# Patient Record
Sex: MALE | Race: BLACK OR AFRICAN AMERICAN | NOT HISPANIC OR LATINO | Employment: OTHER | URBAN - METROPOLITAN AREA
[De-identification: names, ages, dates, MRNs, and addresses within clinical notes are randomized per-mention and may not be internally consistent; named-entity substitution may affect disease eponyms.]

---

## 2017-01-01 ENCOUNTER — HOSPITAL ENCOUNTER (OUTPATIENT)
Dept: INFUSION CENTER | Facility: HOSPITAL | Age: 62
Discharge: HOME | End: 2017-05-26
Payer: COMMERCIAL

## 2017-01-01 ENCOUNTER — HOSPITAL ENCOUNTER (EMERGENCY)
Facility: HOSPITAL | Age: 62
Discharge: HOME | End: 2017-04-27
Attending: EMERGENCY MEDICINE | Admitting: EMERGENCY MEDICINE
Payer: COMMERCIAL

## 2017-01-01 ENCOUNTER — ALLSCRIPTS OFFICE VISIT (OUTPATIENT)
Dept: OTHER | Facility: OTHER | Age: 62
End: 2017-01-01

## 2017-01-01 ENCOUNTER — GENERIC CONVERSION - ENCOUNTER (OUTPATIENT)
Dept: OTHER | Facility: OTHER | Age: 62
End: 2017-01-01

## 2017-01-01 ENCOUNTER — TRANSCRIBE ORDERS (OUTPATIENT)
Dept: ADMINISTRATIVE | Age: 62
End: 2017-01-01

## 2017-01-01 ENCOUNTER — APPOINTMENT (INPATIENT)
Dept: RADIOLOGY | Facility: HOSPITAL | Age: 62
DRG: 871 | End: 2017-01-01
Payer: COMMERCIAL

## 2017-01-01 ENCOUNTER — APPOINTMENT (EMERGENCY)
Dept: RADIOLOGY | Facility: HOSPITAL | Age: 62
DRG: 181 | End: 2017-01-01
Payer: COMMERCIAL

## 2017-01-01 ENCOUNTER — HOSPITAL ENCOUNTER (INPATIENT)
Facility: HOSPITAL | Age: 62
LOS: 8 days | Discharge: HOME HEALTH | DRG: 871 | End: 2017-04-22
Attending: EMERGENCY MEDICINE | Admitting: HOSPITALIST
Payer: COMMERCIAL

## 2017-01-01 ENCOUNTER — APPOINTMENT (EMERGENCY)
Dept: RADIOLOGY | Facility: HOSPITAL | Age: 62
End: 2017-01-01
Payer: COMMERCIAL

## 2017-01-01 ENCOUNTER — HOSPITAL ENCOUNTER (OUTPATIENT)
Dept: RADIOLOGY | Age: 62
Discharge: HOME | End: 2017-02-13
Payer: COMMERCIAL

## 2017-01-01 ENCOUNTER — APPOINTMENT (EMERGENCY)
Dept: RADIOLOGY | Facility: HOSPITAL | Age: 62
DRG: 180 | End: 2017-01-01
Payer: COMMERCIAL

## 2017-01-01 ENCOUNTER — HOSPITAL ENCOUNTER (OUTPATIENT)
Dept: INFUSION CENTER | Facility: HOSPITAL | Age: 62
Discharge: HOME | End: 2017-05-10
Payer: COMMERCIAL

## 2017-01-01 ENCOUNTER — HOSPITAL ENCOUNTER (INPATIENT)
Facility: HOSPITAL | Age: 62
LOS: 6 days | DRG: 180 | End: 2017-06-08
Attending: EMERGENCY MEDICINE | Admitting: INTERNAL MEDICINE
Payer: COMMERCIAL

## 2017-01-01 ENCOUNTER — HOSPITAL ENCOUNTER (OUTPATIENT)
Dept: RADIOLOGY | Age: 62
Discharge: HOME | End: 2017-05-19
Payer: COMMERCIAL

## 2017-01-01 ENCOUNTER — APPOINTMENT (EMERGENCY)
Dept: RADIOLOGY | Facility: HOSPITAL | Age: 62
DRG: 687 | End: 2017-01-01
Payer: COMMERCIAL

## 2017-01-01 ENCOUNTER — APPOINTMENT (EMERGENCY)
Dept: RADIOLOGY | Facility: HOSPITAL | Age: 62
DRG: 871 | End: 2017-01-01
Payer: COMMERCIAL

## 2017-01-01 ENCOUNTER — HOSPITAL ENCOUNTER (INPATIENT)
Facility: HOSPITAL | Age: 62
LOS: 1 days | Discharge: HOME | DRG: 181 | End: 2017-04-11
Attending: EMERGENCY MEDICINE | Admitting: INTERNAL MEDICINE
Payer: COMMERCIAL

## 2017-01-01 ENCOUNTER — HOSPITAL ENCOUNTER (EMERGENCY)
Facility: HOSPITAL | Age: 62
Discharge: HOME | End: 2017-05-25
Attending: EMERGENCY MEDICINE | Admitting: EMERGENCY MEDICINE
Payer: COMMERCIAL

## 2017-01-01 ENCOUNTER — APPOINTMENT (INPATIENT)
Dept: RADIOLOGY | Facility: HOSPITAL | Age: 62
DRG: 180 | End: 2017-01-01
Attending: INTERNAL MEDICINE
Payer: COMMERCIAL

## 2017-01-01 ENCOUNTER — HOSPITAL ENCOUNTER (OUTPATIENT)
Dept: INFUSION CENTER | Facility: HOSPITAL | Age: 62
Discharge: HOME | End: 2017-05-11
Payer: COMMERCIAL

## 2017-01-01 ENCOUNTER — APPOINTMENT (INPATIENT)
Dept: RADIOLOGY | Facility: HOSPITAL | Age: 62
DRG: 180 | End: 2017-01-01
Payer: COMMERCIAL

## 2017-01-01 ENCOUNTER — APPOINTMENT (INPATIENT)
Dept: PHYSICAL THERAPY | Facility: HOSPITAL | Age: 62
DRG: 181 | End: 2017-01-01
Payer: COMMERCIAL

## 2017-01-01 ENCOUNTER — APPOINTMENT (INPATIENT)
Dept: RADIOLOGY | Facility: HOSPITAL | Age: 62
DRG: 687 | End: 2017-01-01
Payer: COMMERCIAL

## 2017-01-01 ENCOUNTER — HOSPITAL ENCOUNTER (OUTPATIENT)
Dept: INFUSION CENTER | Facility: HOSPITAL | Age: 62
End: 2017-01-01
Payer: COMMERCIAL

## 2017-01-01 ENCOUNTER — HOSPITAL ENCOUNTER (INPATIENT)
Facility: HOSPITAL | Age: 62
LOS: 2 days | Discharge: HOME HEALTH | DRG: 687 | End: 2017-03-08
Attending: EMERGENCY MEDICINE | Admitting: INTERNAL MEDICINE
Payer: COMMERCIAL

## 2017-01-01 ENCOUNTER — APPOINTMENT (INPATIENT)
Dept: RADIOLOGY | Facility: HOSPITAL | Age: 62
DRG: 181 | End: 2017-01-01
Attending: INTERNAL MEDICINE
Payer: COMMERCIAL

## 2017-01-01 ENCOUNTER — TRANSCRIBE ORDERS (OUTPATIENT)
Dept: ADMINISTRATIVE | Facility: HOSPITAL | Age: 62
End: 2017-01-01

## 2017-01-01 VITALS
TEMPERATURE: 97.2 F | HEIGHT: 69 IN | DIASTOLIC BLOOD PRESSURE: 55 MMHG | RESPIRATION RATE: 18 BRPM | WEIGHT: 162.26 LBS | HEART RATE: 91 BPM | SYSTOLIC BLOOD PRESSURE: 95 MMHG | BODY MASS INDEX: 24.03 KG/M2

## 2017-01-01 VITALS
DIASTOLIC BLOOD PRESSURE: 64 MMHG | RESPIRATION RATE: 20 BRPM | BODY MASS INDEX: 23.13 KG/M2 | HEIGHT: 70 IN | HEART RATE: 64 BPM | SYSTOLIC BLOOD PRESSURE: 112 MMHG | WEIGHT: 161.6 LBS | TEMPERATURE: 96.5 F

## 2017-01-01 VITALS
HEART RATE: 60 BPM | WEIGHT: 171 LBS | OXYGEN SATURATION: 97 % | SYSTOLIC BLOOD PRESSURE: 124 MMHG | TEMPERATURE: 98.1 F | BODY MASS INDEX: 23.94 KG/M2 | DIASTOLIC BLOOD PRESSURE: 70 MMHG | RESPIRATION RATE: 18 BRPM | HEIGHT: 71 IN

## 2017-01-01 VITALS
HEART RATE: 65 BPM | DIASTOLIC BLOOD PRESSURE: 73 MMHG | WEIGHT: 171.3 LBS | RESPIRATION RATE: 18 BRPM | SYSTOLIC BLOOD PRESSURE: 143 MMHG | BODY MASS INDEX: 23.98 KG/M2 | HEIGHT: 71 IN | TEMPERATURE: 98.3 F | OXYGEN SATURATION: 97 %

## 2017-01-01 VITALS
BODY MASS INDEX: 24.29 KG/M2 | TEMPERATURE: 97.6 F | HEART RATE: 56 BPM | DIASTOLIC BLOOD PRESSURE: 63 MMHG | WEIGHT: 173.5 LBS | HEIGHT: 71 IN | SYSTOLIC BLOOD PRESSURE: 91 MMHG | RESPIRATION RATE: 20 BRPM | OXYGEN SATURATION: 97 %

## 2017-01-01 VITALS
HEIGHT: 71 IN | OXYGEN SATURATION: 95 % | WEIGHT: 162.7 LBS | SYSTOLIC BLOOD PRESSURE: 147 MMHG | DIASTOLIC BLOOD PRESSURE: 92 MMHG | HEART RATE: 125 BPM | BODY MASS INDEX: 22.78 KG/M2 | RESPIRATION RATE: 22 BRPM | TEMPERATURE: 98.1 F

## 2017-01-01 VITALS
OXYGEN SATURATION: 99 % | HEART RATE: 88 BPM | RESPIRATION RATE: 18 BRPM | SYSTOLIC BLOOD PRESSURE: 110 MMHG | TEMPERATURE: 99.5 F | BODY MASS INDEX: 23.99 KG/M2 | DIASTOLIC BLOOD PRESSURE: 70 MMHG | WEIGHT: 172 LBS

## 2017-01-01 VITALS
DIASTOLIC BLOOD PRESSURE: 56 MMHG | SYSTOLIC BLOOD PRESSURE: 98 MMHG | TEMPERATURE: 96 F | RESPIRATION RATE: 20 BRPM | HEART RATE: 68 BPM

## 2017-01-01 VITALS
RESPIRATION RATE: 20 BRPM | HEART RATE: 111 BPM | WEIGHT: 162 LBS | SYSTOLIC BLOOD PRESSURE: 117 MMHG | TEMPERATURE: 98.3 F | OXYGEN SATURATION: 94 % | BODY MASS INDEX: 22.93 KG/M2 | DIASTOLIC BLOOD PRESSURE: 80 MMHG

## 2017-01-01 DIAGNOSIS — R53.1 GENERALIZED WEAKNESS: ICD-10-CM

## 2017-01-01 DIAGNOSIS — M75.01 ADHESIVE CAPSULITIS OF RIGHT SHOULDER: ICD-10-CM

## 2017-01-01 DIAGNOSIS — C78.00 LUNG METASTASIS (HCC): Primary | ICD-10-CM

## 2017-01-01 DIAGNOSIS — R65.10 SIRS (SYSTEMIC INFLAMMATORY RESPONSE SYNDROME) (HCC): ICD-10-CM

## 2017-01-01 DIAGNOSIS — R06.02 SHORTNESS OF BREATH: ICD-10-CM

## 2017-01-01 DIAGNOSIS — J90 PLEURAL EFFUSION: Primary | ICD-10-CM

## 2017-01-01 DIAGNOSIS — C64.2 RENAL CELL CARCINOMA, LEFT (HCC): ICD-10-CM

## 2017-01-01 DIAGNOSIS — J91.0 PLEURAL EFFUSION, MALIGNANT: ICD-10-CM

## 2017-01-01 DIAGNOSIS — M75.01 ADHESIVE BURSITIS OF RIGHT SHOULDER: Primary | ICD-10-CM

## 2017-01-01 DIAGNOSIS — C78.00 METASTATIC CANCER TO LUNG (HCC): Primary | ICD-10-CM

## 2017-01-01 DIAGNOSIS — R09.02 HYPOXEMIA: ICD-10-CM

## 2017-01-01 DIAGNOSIS — C64.9 MALIGNANT NEOPLASM OF KIDNEY EXCLUDING RENAL PELVIS (HCC): ICD-10-CM

## 2017-01-01 DIAGNOSIS — R06.02 SHORTNESS OF BREATH: Primary | ICD-10-CM

## 2017-01-01 DIAGNOSIS — J90 PLEURAL EFFUSION, NOT ELSEWHERE CLASSIFIED: ICD-10-CM

## 2017-01-01 DIAGNOSIS — J18.9 HOSPITAL-ACQUIRED PNEUMONIA: Primary | ICD-10-CM

## 2017-01-01 DIAGNOSIS — R05.9 COUGH: ICD-10-CM

## 2017-01-01 DIAGNOSIS — J90 PLEURAL EFFUSION: ICD-10-CM

## 2017-01-01 DIAGNOSIS — D30.02 BENIGN NEOPLASM OF LEFT KIDNEY: ICD-10-CM
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
## 2017-01-01 DIAGNOSIS — J18.1 LUNG CONSOLIDATION (HCC): Primary | ICD-10-CM

## 2017-01-01 DIAGNOSIS — C64.9 METASTATIC RENAL CELL CARCINOMA (HCC): ICD-10-CM

## 2017-01-01 DIAGNOSIS — M25.511 PAIN IN RIGHT SHOULDER: ICD-10-CM

## 2017-01-01 LAB
ALBUMIN FLD-MCNC: 3.1 G/DL
ALBUMIN SERPL BCP-MCNC: 2 G/DL (ref 3.5–5)
ALBUMIN SERPL BCP-MCNC: 2.1 G/DL (ref 3.5–5)
ALBUMIN SERPL BCP-MCNC: 2.5 G/DL (ref 3.5–5)
ALBUMIN SERPL BCP-MCNC: 2.5 G/DL (ref 3.5–5)
ALBUMIN SERPL BCP-MCNC: 2.7 G/DL (ref 3.5–5)
ALBUMIN SERPL BCP-MCNC: 2.7 G/DL (ref 3.5–5)
ALP SERPL-CCNC: 53 U/L (ref 46–116)
ALP SERPL-CCNC: 55 U/L (ref 46–116)
ALP SERPL-CCNC: 57 U/L (ref 46–116)
ALP SERPL-CCNC: 58 U/L (ref 46–116)
ALP SERPL-CCNC: 76 U/L (ref 46–116)
ALP SERPL-CCNC: 81 U/L (ref 46–116)
ALT SERPL W P-5'-P-CCNC: 16 U/L (ref 12–78)
ALT SERPL W P-5'-P-CCNC: 17 U/L (ref 12–78)
ALT SERPL W P-5'-P-CCNC: 17 U/L (ref 12–78)
ALT SERPL W P-5'-P-CCNC: 20 U/L (ref 12–78)
ALT SERPL W P-5'-P-CCNC: 21 U/L (ref 12–78)
ALT SERPL W P-5'-P-CCNC: 21 U/L (ref 12–78)
ANION GAP SERPL CALCULATED.3IONS-SCNC: 11 MMOL/L (ref 4–13)
ANION GAP SERPL CALCULATED.3IONS-SCNC: 5 MMOL/L (ref 4–13)
ANION GAP SERPL CALCULATED.3IONS-SCNC: 6 MMOL/L (ref 4–13)
ANION GAP SERPL CALCULATED.3IONS-SCNC: 7 MMOL/L (ref 4–13)
ANION GAP SERPL CALCULATED.3IONS-SCNC: 8 MMOL/L (ref 4–13)
ANION GAP SERPL CALCULATED.3IONS-SCNC: 8 MMOL/L (ref 4–13)
ANION GAP SERPL CALCULATED.3IONS-SCNC: 9 MMOL/L (ref 4–13)
ANISOCYTOSIS BLD QL SMEAR: PRESENT
APPEARANCE FLD: ABNORMAL
APTT PPP: 28 SECONDS (ref 23–35)
APTT PPP: 29 SECONDS (ref 24–36)
AST SERPL W P-5'-P-CCNC: 18 U/L (ref 5–45)
AST SERPL W P-5'-P-CCNC: 18 U/L (ref 5–45)
AST SERPL W P-5'-P-CCNC: 21 U/L (ref 5–45)
AST SERPL W P-5'-P-CCNC: 28 U/L (ref 5–45)
AST SERPL W P-5'-P-CCNC: 31 U/L (ref 5–45)
AST SERPL W P-5'-P-CCNC: 73 U/L (ref 5–45)
ATRIAL RATE: 101 BPM
ATRIAL RATE: 103 BPM
ATRIAL RATE: 56 BPM
ATRIAL RATE: 67 BPM
ATRIAL RATE: 72 BPM
ATRIAL RATE: 80 BPM
ATRIAL RATE: 85 BPM
BACTERIA BLD CULT: NORMAL
BACTERIA SPEC BFLD CULT: NO GROWTH
BACTERIA SPT RESP CULT: NORMAL
BACTERIA STL CULT: NORMAL
BACTERIA STL CULT: NORMAL
BASOPHILS # BLD AUTO: 0.01 THOUSANDS/ΜL (ref 0–0.1)
BASOPHILS # BLD AUTO: 0.02 THOUSANDS/ΜL (ref 0–0.1)
BASOPHILS # BLD MANUAL: 0 THOUSAND/UL (ref 0–0.1)
BASOPHILS NFR BLD AUTO: 0 % (ref 0–1)
BASOPHILS NFR MAR MANUAL: 0 % (ref 0–1)
BILIRUB SERPL-MCNC: 0.29 MG/DL (ref 0.2–1)
BILIRUB SERPL-MCNC: 0.34 MG/DL (ref 0.2–1)
BILIRUB SERPL-MCNC: 0.39 MG/DL (ref 0.2–1)
BILIRUB SERPL-MCNC: 0.39 MG/DL (ref 0.2–1)
BILIRUB SERPL-MCNC: 0.43 MG/DL (ref 0.2–1)
BILIRUB SERPL-MCNC: 0.61 MG/DL (ref 0.2–1)
BILIRUB UR QL STRIP: NEGATIVE
BUN SERPL-MCNC: 10 MG/DL (ref 5–25)
BUN SERPL-MCNC: 10 MG/DL (ref 5–25)
BUN SERPL-MCNC: 12 MG/DL (ref 5–25)
BUN SERPL-MCNC: 15 MG/DL (ref 5–25)
BUN SERPL-MCNC: 23 MG/DL (ref 5–25)
BUN SERPL-MCNC: 26 MG/DL (ref 5–25)
BUN SERPL-MCNC: 28 MG/DL (ref 5–25)
BUN SERPL-MCNC: 6 MG/DL (ref 5–25)
BUN SERPL-MCNC: 7 MG/DL (ref 5–25)
BUN SERPL-MCNC: 8 MG/DL (ref 5–25)
BUN SERPL-MCNC: 9 MG/DL (ref 5–25)
C DIFF TOX GENS STL QL NAA+PROBE: NORMAL
CALCIUM SERPL-MCNC: 7.4 MG/DL (ref 8.3–10.1)
CALCIUM SERPL-MCNC: 8 MG/DL (ref 8.3–10.1)
CALCIUM SERPL-MCNC: 8.2 MG/DL (ref 8.3–10.1)
CALCIUM SERPL-MCNC: 8.3 MG/DL (ref 8.3–10.1)
CALCIUM SERPL-MCNC: 8.4 MG/DL (ref 8.3–10.1)
CALCIUM SERPL-MCNC: 8.5 MG/DL (ref 8.3–10.1)
CALCIUM SERPL-MCNC: 8.5 MG/DL (ref 8.3–10.1)
CALCIUM SERPL-MCNC: 8.7 MG/DL (ref 8.3–10.1)
CALCIUM SERPL-MCNC: 8.8 MG/DL (ref 8.3–10.1)
CALCIUM SERPL-MCNC: 8.9 MG/DL (ref 8.3–10.1)
CALCIUM SERPL-MCNC: 9.1 MG/DL (ref 8.3–10.1)
CALCIUM SERPL-MCNC: 9.2 MG/DL (ref 8.3–10.1)
CALCIUM SERPL-MCNC: 9.4 MG/DL (ref 8.3–10.1)
CALCIUM SERPL-MCNC: 9.4 MG/DL (ref 8.3–10.1)
CHLORIDE SERPL-SCNC: 101 MMOL/L (ref 100–108)
CHLORIDE SERPL-SCNC: 101 MMOL/L (ref 100–108)
CHLORIDE SERPL-SCNC: 102 MMOL/L (ref 100–108)
CHLORIDE SERPL-SCNC: 103 MMOL/L (ref 100–108)
CHLORIDE SERPL-SCNC: 104 MMOL/L (ref 100–108)
CHLORIDE SERPL-SCNC: 105 MMOL/L (ref 100–108)
CHLORIDE SERPL-SCNC: 106 MMOL/L (ref 100–108)
CHLORIDE SERPL-SCNC: 106 MMOL/L (ref 100–108)
CHLORIDE SERPL-SCNC: 107 MMOL/L (ref 100–108)
CHLORIDE SERPL-SCNC: 108 MMOL/L (ref 100–108)
CHLORIDE SERPL-SCNC: 110 MMOL/L (ref 100–108)
CHLORIDE SERPL-SCNC: 96 MMOL/L (ref 100–108)
CLARITY UR: CLEAR
CO2 SERPL-SCNC: 23 MMOL/L (ref 21–32)
CO2 SERPL-SCNC: 24 MMOL/L (ref 21–32)
CO2 SERPL-SCNC: 25 MMOL/L (ref 21–32)
CO2 SERPL-SCNC: 26 MMOL/L (ref 21–32)
CO2 SERPL-SCNC: 26 MMOL/L (ref 21–32)
CO2 SERPL-SCNC: 27 MMOL/L (ref 21–32)
CO2 SERPL-SCNC: 27 MMOL/L (ref 21–32)
CO2 SERPL-SCNC: 29 MMOL/L (ref 21–32)
CO2 SERPL-SCNC: 29 MMOL/L (ref 21–32)
CO2 SERPL-SCNC: 30 MMOL/L (ref 21–32)
CO2 SERPL-SCNC: 31 MMOL/L (ref 21–32)
CO2 SERPL-SCNC: 32 MMOL/L (ref 21–32)
CO2 SERPL-SCNC: 33 MMOL/L (ref 21–32)
COLOR FLD: YELLOW
COLOR UR: YELLOW
CREAT SERPL-MCNC: 0.59 MG/DL (ref 0.6–1.3)
CREAT SERPL-MCNC: 0.66 MG/DL (ref 0.6–1.3)
CREAT SERPL-MCNC: 0.68 MG/DL (ref 0.6–1.3)
CREAT SERPL-MCNC: 0.68 MG/DL (ref 0.6–1.3)
CREAT SERPL-MCNC: 0.73 MG/DL (ref 0.6–1.3)
CREAT SERPL-MCNC: 0.77 MG/DL (ref 0.6–1.3)
CREAT SERPL-MCNC: 0.78 MG/DL (ref 0.6–1.3)
CREAT SERPL-MCNC: 0.79 MG/DL (ref 0.6–1.3)
CREAT SERPL-MCNC: 0.81 MG/DL (ref 0.6–1.3)
CREAT SERPL-MCNC: 0.82 MG/DL (ref 0.6–1.3)
CREAT SERPL-MCNC: 0.82 MG/DL (ref 0.6–1.3)
CREAT SERPL-MCNC: 0.83 MG/DL (ref 0.6–1.3)
CREAT SERPL-MCNC: 0.89 MG/DL (ref 0.6–1.3)
CREAT SERPL-MCNC: 0.89 MG/DL (ref 0.6–1.3)
CREAT SERPL-MCNC: 0.9 MG/DL (ref 0.6–1.3)
CREAT SERPL-MCNC: 1.13 MG/DL (ref 0.6–1.3)
EOSINOPHIL # BLD AUTO: 0 THOUSAND/ΜL (ref 0–0.61)
EOSINOPHIL # BLD AUTO: 0 THOUSAND/ΜL (ref 0–0.61)
EOSINOPHIL # BLD AUTO: 0.01 THOUSAND/ΜL (ref 0–0.61)
EOSINOPHIL # BLD AUTO: 0.02 THOUSAND/ΜL (ref 0–0.61)
EOSINOPHIL # BLD AUTO: 0.05 THOUSAND/ΜL (ref 0–0.61)
EOSINOPHIL # BLD AUTO: 0.06 THOUSAND/ΜL (ref 0–0.61)
EOSINOPHIL # BLD AUTO: 0.1 THOUSAND/ΜL (ref 0–0.61)
EOSINOPHIL # BLD AUTO: 0.11 THOUSAND/ΜL (ref 0–0.61)
EOSINOPHIL # BLD AUTO: 0.2 THOUSAND/ΜL (ref 0–0.61)
EOSINOPHIL # BLD MANUAL: 0 THOUSAND/UL (ref 0–0.4)
EOSINOPHIL NFR BLD AUTO: 0 % (ref 0–6)
EOSINOPHIL NFR BLD AUTO: 1 % (ref 0–6)
EOSINOPHIL NFR BLD AUTO: 2 % (ref 0–6)
EOSINOPHIL NFR BLD MANUAL: 0 % (ref 0–6)
ERYTHROCYTE [DISTWIDTH] IN BLOOD BY AUTOMATED COUNT: 13.3 % (ref 11.6–15.1)
ERYTHROCYTE [DISTWIDTH] IN BLOOD BY AUTOMATED COUNT: 13.4 % (ref 11.6–15.1)
ERYTHROCYTE [DISTWIDTH] IN BLOOD BY AUTOMATED COUNT: 13.5 % (ref 11.6–15.1)
ERYTHROCYTE [DISTWIDTH] IN BLOOD BY AUTOMATED COUNT: 13.6 % (ref 11.6–15.1)
ERYTHROCYTE [DISTWIDTH] IN BLOOD BY AUTOMATED COUNT: 13.7 % (ref 11.6–15.1)
ERYTHROCYTE [DISTWIDTH] IN BLOOD BY AUTOMATED COUNT: 13.8 % (ref 11.6–15.1)
ERYTHROCYTE [DISTWIDTH] IN BLOOD BY AUTOMATED COUNT: 14.1 % (ref 11.6–15.1)
ERYTHROCYTE [DISTWIDTH] IN BLOOD BY AUTOMATED COUNT: 14.4 % (ref 11.6–15.1)
ERYTHROCYTE [DISTWIDTH] IN BLOOD BY AUTOMATED COUNT: 14.5 % (ref 11.6–15.1)
ERYTHROCYTE [DISTWIDTH] IN BLOOD BY AUTOMATED COUNT: 14.6 % (ref 11.6–15.1)
ERYTHROCYTE [DISTWIDTH] IN BLOOD BY AUTOMATED COUNT: 14.8 % (ref 11.6–15.1)
ERYTHROCYTE [DISTWIDTH] IN BLOOD BY AUTOMATED COUNT: 15.3 % (ref 11.6–15.1)
ERYTHROCYTE [DISTWIDTH] IN BLOOD BY AUTOMATED COUNT: 15.6 % (ref 11.6–15.1)
ERYTHROCYTE [DISTWIDTH] IN BLOOD BY AUTOMATED COUNT: 15.7 % (ref 11.6–15.1)
ERYTHROCYTE [DISTWIDTH] IN BLOOD BY AUTOMATED COUNT: 15.7 % (ref 11.6–15.1)
FLUAV AG SPEC QL IA: NEGATIVE
FLUAV AG SPEC QL: ABNORMAL
FLUAV AG SPEC QL: NORMAL
FLUBV AG SPEC QL IA: NEGATIVE
FLUBV AG SPEC QL: DETECTED
FLUBV AG SPEC QL: NORMAL
GFR SERPL CREATININE-BSD FRML MDRD: >60 ML/MIN/1.73SQ M
GIANT PLATELETS BLD QL SMEAR: PRESENT
GLUCOSE FLD-MCNC: 40 MG/DL
GLUCOSE SERPL-MCNC: 108 MG/DL (ref 65–140)
GLUCOSE SERPL-MCNC: 116 MG/DL (ref 65–140)
GLUCOSE SERPL-MCNC: 119 MG/DL (ref 65–140)
GLUCOSE SERPL-MCNC: 127 MG/DL (ref 65–140)
GLUCOSE SERPL-MCNC: 127 MG/DL (ref 65–140)
GLUCOSE SERPL-MCNC: 135 MG/DL (ref 65–140)
GLUCOSE SERPL-MCNC: 137 MG/DL (ref 65–140)
GLUCOSE SERPL-MCNC: 147 MG/DL (ref 65–140)
GLUCOSE SERPL-MCNC: 150 MG/DL (ref 65–140)
GLUCOSE SERPL-MCNC: 75 MG/DL (ref 65–140)
GLUCOSE SERPL-MCNC: 81 MG/DL (ref 65–140)
GLUCOSE SERPL-MCNC: 82 MG/DL (ref 65–140)
GLUCOSE SERPL-MCNC: 85 MG/DL (ref 65–140)
GLUCOSE SERPL-MCNC: 91 MG/DL (ref 65–140)
GLUCOSE SERPL-MCNC: 97 MG/DL (ref 65–140)
GLUCOSE SERPL-MCNC: 97 MG/DL (ref 65–140)
GLUCOSE UR STRIP-MCNC: NEGATIVE MG/DL
GRAM STN SPEC: NORMAL
HCT VFR BLD AUTO: 34.1 % (ref 36.5–49.3)
HCT VFR BLD AUTO: 34.3 % (ref 36.5–49.3)
HCT VFR BLD AUTO: 34.3 % (ref 36.5–49.3)
HCT VFR BLD AUTO: 34.7 % (ref 36.5–49.3)
HCT VFR BLD AUTO: 35.1 % (ref 36.5–49.3)
HCT VFR BLD AUTO: 35.3 % (ref 36.5–49.3)
HCT VFR BLD AUTO: 35.8 % (ref 36.5–49.3)
HCT VFR BLD AUTO: 36.2 % (ref 36.5–49.3)
HCT VFR BLD AUTO: 36.3 % (ref 36.5–49.3)
HCT VFR BLD AUTO: 36.6 % (ref 36.5–49.3)
HCT VFR BLD AUTO: 37.2 % (ref 36.5–49.3)
HCT VFR BLD AUTO: 37.8 % (ref 36.5–49.3)
HCT VFR BLD AUTO: 37.9 % (ref 36.5–49.3)
HCT VFR BLD AUTO: 38.5 % (ref 36.5–49.3)
HCT VFR BLD AUTO: 38.9 % (ref 36.5–49.3)
HCT VFR BLD AUTO: 39.8 % (ref 36.5–49.3)
HCT VFR BLD AUTO: 42.8 % (ref 36.5–49.3)
HGB BLD-MCNC: 10.9 G/DL (ref 12–17)
HGB BLD-MCNC: 11.1 G/DL (ref 12–17)
HGB BLD-MCNC: 11.2 G/DL (ref 12–17)
HGB BLD-MCNC: 11.4 G/DL (ref 12–17)
HGB BLD-MCNC: 11.6 G/DL (ref 12–17)
HGB BLD-MCNC: 11.6 G/DL (ref 12–17)
HGB BLD-MCNC: 11.7 G/DL (ref 12–17)
HGB BLD-MCNC: 11.9 G/DL (ref 12–17)
HGB BLD-MCNC: 12.1 G/DL (ref 12–17)
HGB BLD-MCNC: 12.7 G/DL (ref 12–17)
HGB BLD-MCNC: 12.8 G/DL (ref 12–17)
HGB BLD-MCNC: 13.6 G/DL (ref 12–17)
HGB BLD-MCNC: 13.6 G/DL (ref 12–17)
HGB UR QL STRIP.AUTO: NEGATIVE
HISTIOCYTES NFR FLD: 70 %
INR PPP: 0.91 (ref 0.86–1.16)
INR PPP: 1.01 (ref 0.86–1.16)
INR PPP: 1.11 (ref 0.86–1.16)
KETONES UR STRIP-MCNC: ABNORMAL MG/DL
L PNEUMO1 AG UR QL IA.RAPID: NEGATIVE
LACTATE SERPL-SCNC: 1.8 MMOL/L (ref 0.5–2)
LACTATE SERPL-SCNC: 1.9 MMOL/L (ref 0.5–2)
LACTATE SERPL-SCNC: 2.7 MMOL/L (ref 0.5–2)
LEUKOCYTE ESTERASE UR QL STRIP: NEGATIVE
LYMPHOCYTES # BLD AUTO: 0.79 THOUSANDS/ΜL (ref 0.6–4.47)
LYMPHOCYTES # BLD AUTO: 0.92 THOUSAND/UL (ref 0.6–4.47)
LYMPHOCYTES # BLD AUTO: 1.03 THOUSANDS/ΜL (ref 0.6–4.47)
LYMPHOCYTES # BLD AUTO: 1.25 THOUSANDS/ΜL (ref 0.6–4.47)
LYMPHOCYTES # BLD AUTO: 1.59 THOUSANDS/ΜL (ref 0.6–4.47)
LYMPHOCYTES # BLD AUTO: 1.96 THOUSANDS/ΜL (ref 0.6–4.47)
LYMPHOCYTES # BLD AUTO: 10 % (ref 14–44)
LYMPHOCYTES # BLD AUTO: 2.42 THOUSANDS/ΜL (ref 0.6–4.47)
LYMPHOCYTES # BLD AUTO: 2.54 THOUSANDS/ΜL (ref 0.6–4.47)
LYMPHOCYTES # BLD AUTO: 2.79 THOUSANDS/ΜL (ref 0.6–4.47)
LYMPHOCYTES # BLD AUTO: 2.86 THOUSANDS/ΜL (ref 0.6–4.47)
LYMPHOCYTES NFR BLD AUTO: 12 % (ref 14–44)
LYMPHOCYTES NFR BLD AUTO: 18 % (ref 14–44)
LYMPHOCYTES NFR BLD AUTO: 20 % (ref 14–44)
LYMPHOCYTES NFR BLD AUTO: 21 % (ref 14–44)
LYMPHOCYTES NFR BLD AUTO: 28 % (ref 14–44)
LYMPHOCYTES NFR BLD AUTO: 39 % (ref 14–44)
LYMPHOCYTES NFR BLD AUTO: 4 %
LYMPHOCYTES NFR BLD AUTO: 43 % (ref 14–44)
MACROCYTES BLD QL AUTO: PRESENT
MAGNESIUM SERPL-MCNC: 2.2 MG/DL (ref 1.6–2.6)
MCH RBC QN AUTO: 30.2 PG (ref 26.8–34.3)
MCH RBC QN AUTO: 30.3 PG (ref 26.8–34.3)
MCH RBC QN AUTO: 30.4 PG (ref 26.8–34.3)
MCH RBC QN AUTO: 30.8 PG (ref 26.8–34.3)
MCH RBC QN AUTO: 32.4 PG (ref 26.8–34.3)
MCH RBC QN AUTO: 32.9 PG (ref 26.8–34.3)
MCH RBC QN AUTO: 32.9 PG (ref 26.8–34.3)
MCH RBC QN AUTO: 33.1 PG (ref 26.8–34.3)
MCH RBC QN AUTO: 33.2 PG (ref 26.8–34.3)
MCH RBC QN AUTO: 33.2 PG (ref 26.8–34.3)
MCH RBC QN AUTO: 33.5 PG (ref 26.8–34.3)
MCH RBC QN AUTO: 33.6 PG (ref 26.8–34.3)
MCH RBC QN AUTO: 33.6 PG (ref 26.8–34.3)
MCH RBC QN AUTO: 33.7 PG (ref 26.8–34.3)
MCH RBC QN AUTO: 33.8 PG (ref 26.8–34.3)
MCH RBC QN AUTO: 34.2 PG (ref 26.8–34.3)
MCH RBC QN AUTO: 34.2 PG (ref 26.8–34.3)
MCHC RBC AUTO-ENTMCNC: 31.1 G/DL (ref 31.4–37.4)
MCHC RBC AUTO-ENTMCNC: 31.6 G/DL (ref 31.4–37.4)
MCHC RBC AUTO-ENTMCNC: 31.8 G/DL (ref 31.4–37.4)
MCHC RBC AUTO-ENTMCNC: 31.8 G/DL (ref 31.4–37.4)
MCHC RBC AUTO-ENTMCNC: 31.9 G/DL (ref 31.4–37.4)
MCHC RBC AUTO-ENTMCNC: 32 G/DL (ref 31.4–37.4)
MCHC RBC AUTO-ENTMCNC: 32.3 G/DL (ref 31.4–37.4)
MCHC RBC AUTO-ENTMCNC: 32.4 G/DL (ref 31.4–37.4)
MCHC RBC AUTO-ENTMCNC: 32.6 G/DL (ref 31.4–37.4)
MCHC RBC AUTO-ENTMCNC: 32.6 G/DL (ref 31.4–37.4)
MCHC RBC AUTO-ENTMCNC: 32.7 G/DL (ref 31.4–37.4)
MCHC RBC AUTO-ENTMCNC: 32.9 G/DL (ref 31.4–37.4)
MCHC RBC AUTO-ENTMCNC: 32.9 G/DL (ref 31.4–37.4)
MCHC RBC AUTO-ENTMCNC: 33.2 G/DL (ref 31.4–37.4)
MCHC RBC AUTO-ENTMCNC: 33.6 G/DL (ref 31.4–37.4)
MCHC RBC AUTO-ENTMCNC: 34.1 G/DL (ref 31.4–37.4)
MCHC RBC AUTO-ENTMCNC: 34.2 G/DL (ref 31.4–37.4)
MCV RBC AUTO: 100 FL (ref 82–98)
MCV RBC AUTO: 100 FL (ref 82–98)
MCV RBC AUTO: 101 FL (ref 82–98)
MCV RBC AUTO: 101 FL (ref 82–98)
MCV RBC AUTO: 102 FL (ref 82–98)
MCV RBC AUTO: 102 FL (ref 82–98)
MCV RBC AUTO: 103 FL (ref 82–98)
MCV RBC AUTO: 103 FL (ref 82–98)
MCV RBC AUTO: 104 FL (ref 82–98)
MCV RBC AUTO: 105 FL (ref 82–98)
MCV RBC AUTO: 105 FL (ref 82–98)
MCV RBC AUTO: 95 FL (ref 82–98)
MCV RBC AUTO: 96 FL (ref 82–98)
MCV RBC AUTO: 96 FL (ref 82–98)
MCV RBC AUTO: 97 FL (ref 82–98)
MCV RBC AUTO: 99 FL (ref 82–98)
MCV RBC AUTO: 99 FL (ref 82–98)
MONO+MESO NFR FLD MANUAL: 8 %
MONOCYTES # BLD AUTO: 0.3 THOUSAND/ΜL (ref 0.17–1.22)
MONOCYTES # BLD AUTO: 0.3 THOUSAND/ΜL (ref 0.17–1.22)
MONOCYTES # BLD AUTO: 0.43 THOUSAND/ΜL (ref 0.17–1.22)
MONOCYTES # BLD AUTO: 0.46 THOUSAND/UL (ref 0–1.22)
MONOCYTES # BLD AUTO: 0.49 THOUSAND/ΜL (ref 0.17–1.22)
MONOCYTES # BLD AUTO: 0.71 THOUSAND/ΜL (ref 0.17–1.22)
MONOCYTES # BLD AUTO: 0.77 THOUSAND/ΜL (ref 0.17–1.22)
MONOCYTES # BLD AUTO: 0.86 THOUSAND/ΜL (ref 0.17–1.22)
MONOCYTES # BLD AUTO: 1.25 THOUSAND/ΜL (ref 0.17–1.22)
MONOCYTES # BLD AUTO: 1.71 THOUSAND/ΜL (ref 0.17–1.22)
MONOCYTES NFR BLD AUTO: 10 % (ref 4–12)
MONOCYTES NFR BLD AUTO: 10 % (ref 4–12)
MONOCYTES NFR BLD AUTO: 11 % (ref 4–12)
MONOCYTES NFR BLD AUTO: 13 % (ref 4–12)
MONOCYTES NFR BLD AUTO: 6 % (ref 4–12)
MONOCYTES NFR BLD AUTO: 7 % (ref 4–12)
MONOCYTES NFR BLD AUTO: 7 % (ref 4–12)
MONOCYTES NFR BLD AUTO: 8 %
MONOCYTES NFR BLD AUTO: 8 % (ref 4–12)
MONOCYTES NFR BLD AUTO: 9 % (ref 4–12)
MONOCYTES NFR BLD: 5 % (ref 4–12)
NEUTROPHILS # BLD AUTO: 10.53 THOUSANDS/ΜL (ref 1.85–7.62)
NEUTROPHILS # BLD AUTO: 2.22 THOUSANDS/ΜL (ref 1.85–7.62)
NEUTROPHILS # BLD AUTO: 2.65 THOUSANDS/ΜL (ref 1.85–7.62)
NEUTROPHILS # BLD AUTO: 3.47 THOUSANDS/ΜL (ref 1.85–7.62)
NEUTROPHILS # BLD AUTO: 3.61 THOUSANDS/ΜL (ref 1.85–7.62)
NEUTROPHILS # BLD AUTO: 4.8 THOUSANDS/ΜL (ref 1.85–7.62)
NEUTROPHILS # BLD AUTO: 6.24 THOUSANDS/ΜL (ref 1.85–7.62)
NEUTROPHILS # BLD AUTO: 7.53 THOUSANDS/ΜL (ref 1.85–7.62)
NEUTROPHILS # BLD AUTO: 8.5 THOUSANDS/ΜL (ref 1.85–7.62)
NEUTROPHILS # BLD MANUAL: 7.71 THOUSAND/UL (ref 1.85–7.62)
NEUTS SEG NFR BLD AUTO: 10 %
NEUTS SEG NFR BLD AUTO: 49 % (ref 43–75)
NEUTS SEG NFR BLD AUTO: 53 % (ref 43–75)
NEUTS SEG NFR BLD AUTO: 62 % (ref 43–75)
NEUTS SEG NFR BLD AUTO: 65 % (ref 43–75)
NEUTS SEG NFR BLD AUTO: 66 % (ref 43–75)
NEUTS SEG NFR BLD AUTO: 70 % (ref 43–75)
NEUTS SEG NFR BLD AUTO: 70 % (ref 43–75)
NEUTS SEG NFR BLD AUTO: 72 % (ref 43–75)
NEUTS SEG NFR BLD AUTO: 82 % (ref 43–75)
NEUTS SEG NFR BLD AUTO: 84 % (ref 43–75)
NITRITE UR QL STRIP: NEGATIVE
NRBC BLD AUTO-RTO: 0 /100 WBCS
NRBC BLD AUTO-RTO: 1 /100 WBCS
NRBC BLD AUTO-RTO: 1 /100 WBCS
NT-PROBNP SERPL-MCNC: 766 PG/ML
P AXIS: 270 DEGREES
P AXIS: 41 DEGREES
P AXIS: 45 DEGREES
P AXIS: 53 DEGREES
P AXIS: 56 DEGREES
P AXIS: 58 DEGREES
P AXIS: 66 DEGREES
PATHOLOGIST INTERPRETATION: NORMAL
PATHOLOGY REVIEW: YES
PH BODY FLUID: 7.6
PH UR STRIP.AUTO: 6 [PH] (ref 4.5–8)
PLATELET # BLD AUTO: 221 THOUSANDS/UL (ref 149–390)
PLATELET # BLD AUTO: 229 THOUSANDS/UL (ref 149–390)
PLATELET # BLD AUTO: 235 THOUSANDS/UL (ref 149–390)
PLATELET # BLD AUTO: 239 THOUSANDS/UL (ref 149–390)
PLATELET # BLD AUTO: 241 THOUSANDS/UL (ref 149–390)
PLATELET # BLD AUTO: 245 THOUSANDS/UL (ref 149–390)
PLATELET # BLD AUTO: 248 THOUSANDS/UL (ref 149–390)
PLATELET # BLD AUTO: 251 THOUSANDS/UL (ref 149–390)
PLATELET # BLD AUTO: 255 THOUSANDS/UL (ref 149–390)
PLATELET # BLD AUTO: 256 THOUSANDS/UL (ref 149–390)
PLATELET # BLD AUTO: 284 THOUSANDS/UL (ref 149–390)
PLATELET # BLD AUTO: 307 THOUSANDS/UL (ref 149–390)
PLATELET # BLD AUTO: 310 THOUSANDS/UL (ref 149–390)
PLATELET # BLD AUTO: 311 THOUSANDS/UL (ref 149–390)
PLATELET # BLD AUTO: 402 THOUSANDS/UL (ref 149–390)
PLATELET # BLD AUTO: 420 THOUSANDS/UL (ref 149–390)
PLATELET # BLD AUTO: 425 THOUSANDS/UL (ref 149–390)
PLATELET # BLD AUTO: 454 THOUSANDS/UL (ref 149–390)
PLATELET # BLD AUTO: 512 THOUSANDS/UL (ref 149–390)
PLATELET BLD QL SMEAR: ADEQUATE
PMV BLD AUTO: 10.1 FL (ref 8.9–12.7)
PMV BLD AUTO: 10.2 FL (ref 8.9–12.7)
PMV BLD AUTO: 10.3 FL (ref 8.9–12.7)
PMV BLD AUTO: 10.4 FL (ref 8.9–12.7)
PMV BLD AUTO: 10.5 FL (ref 8.9–12.7)
PMV BLD AUTO: 10.6 FL (ref 8.9–12.7)
PMV BLD AUTO: 9.3 FL (ref 8.9–12.7)
PMV BLD AUTO: 9.5 FL (ref 8.9–12.7)
PMV BLD AUTO: 9.5 FL (ref 8.9–12.7)
PMV BLD AUTO: 9.7 FL (ref 8.9–12.7)
PMV BLD AUTO: 9.7 FL (ref 8.9–12.7)
PMV BLD AUTO: 9.8 FL (ref 8.9–12.7)
POLYCHROMASIA BLD QL SMEAR: PRESENT
POTASSIUM SERPL-SCNC: 3 MMOL/L (ref 3.5–5.3)
POTASSIUM SERPL-SCNC: 3.1 MMOL/L (ref 3.5–5.3)
POTASSIUM SERPL-SCNC: 3.2 MMOL/L (ref 3.5–5.3)
POTASSIUM SERPL-SCNC: 3.6 MMOL/L (ref 3.5–5.3)
POTASSIUM SERPL-SCNC: 3.7 MMOL/L (ref 3.5–5.3)
POTASSIUM SERPL-SCNC: 3.7 MMOL/L (ref 3.5–5.3)
POTASSIUM SERPL-SCNC: 3.8 MMOL/L (ref 3.5–5.3)
POTASSIUM SERPL-SCNC: 3.9 MMOL/L (ref 3.5–5.3)
POTASSIUM SERPL-SCNC: 3.9 MMOL/L (ref 3.5–5.3)
POTASSIUM SERPL-SCNC: 4 MMOL/L (ref 3.5–5.3)
POTASSIUM SERPL-SCNC: 4.2 MMOL/L (ref 3.5–5.3)
POTASSIUM SERPL-SCNC: 4.4 MMOL/L (ref 3.5–5.3)
POTASSIUM SERPL-SCNC: 4.5 MMOL/L (ref 3.5–5.3)
POTASSIUM SERPL-SCNC: 4.7 MMOL/L (ref 3.5–5.3)
POTASSIUM SERPL-SCNC: 4.9 MMOL/L (ref 3.5–5.3)
POTASSIUM SERPL-SCNC: 4.9 MMOL/L (ref 3.5–5.3)
PR INTERVAL: 118 MS
PR INTERVAL: 124 MS
PR INTERVAL: 128 MS
PR INTERVAL: 134 MS
PR INTERVAL: 134 MS
PR INTERVAL: 136 MS
PR INTERVAL: 138 MS
PROT FLD-MCNC: 5.3 G/DL
PROT SERPL-MCNC: 5.2 G/DL (ref 6.4–8.2)
PROT SERPL-MCNC: 6 G/DL (ref 6.4–8.2)
PROT SERPL-MCNC: 6.1 G/DL (ref 6.4–8.2)
PROT SERPL-MCNC: 6.2 G/DL (ref 6.4–8.2)
PROT SERPL-MCNC: 6.9 G/DL (ref 6.4–8.2)
PROT SERPL-MCNC: 7 G/DL (ref 6.4–8.2)
PROT UR STRIP-MCNC: NEGATIVE MG/DL
PROTHROMBIN TIME: 12.4 SECONDS (ref 12–14.3)
PROTHROMBIN TIME: 13.4 SECONDS (ref 12–14.3)
PROTHROMBIN TIME: 14.3 SECONDS (ref 12.1–14.4)
QRS AXIS: 56 DEGREES
QRS AXIS: 63 DEGREES
QRS AXIS: 69 DEGREES
QRS AXIS: 70 DEGREES
QRS AXIS: 71 DEGREES
QRS AXIS: 72 DEGREES
QRS AXIS: 74 DEGREES
QRSD INTERVAL: 86 MS
QRSD INTERVAL: 88 MS
QRSD INTERVAL: 90 MS
QT INTERVAL: 308 MS
QT INTERVAL: 326 MS
QT INTERVAL: 356 MS
QT INTERVAL: 362 MS
QT INTERVAL: 370 MS
QT INTERVAL: 384 MS
QT INTERVAL: 430 MS
QTC INTERVAL: 403 MS
QTC INTERVAL: 405 MS
QTC INTERVAL: 405 MS
QTC INTERVAL: 414 MS
QTC INTERVAL: 417 MS
QTC INTERVAL: 422 MS
QTC INTERVAL: 423 MS
RBC # BLD AUTO: 3.35 MILLION/UL (ref 3.88–5.62)
RBC # BLD AUTO: 3.37 MILLION/UL (ref 3.88–5.62)
RBC # BLD AUTO: 3.48 MILLION/UL (ref 3.88–5.62)
RBC # BLD AUTO: 3.53 MILLION/UL (ref 3.88–5.62)
RBC # BLD AUTO: 3.58 MILLION/UL (ref 3.88–5.62)
RBC # BLD AUTO: 3.58 MILLION/UL (ref 3.88–5.62)
RBC # BLD AUTO: 3.59 MILLION/UL (ref 3.88–5.62)
RBC # BLD AUTO: 3.6 MILLION/UL (ref 3.88–5.62)
RBC # BLD AUTO: 3.64 MILLION/UL (ref 3.88–5.62)
RBC # BLD AUTO: 3.66 MILLION/UL (ref 3.88–5.62)
RBC # BLD AUTO: 3.71 MILLION/UL (ref 3.88–5.62)
RBC # BLD AUTO: 3.76 MILLION/UL (ref 3.88–5.62)
RBC # BLD AUTO: 3.77 MILLION/UL (ref 3.88–5.62)
RBC # BLD AUTO: 3.78 MILLION/UL (ref 3.88–5.62)
RBC # BLD AUTO: 3.85 MILLION/UL (ref 3.88–5.62)
RBC # BLD AUTO: 3.98 MILLION/UL (ref 3.88–5.62)
RBC # BLD AUTO: 4.06 MILLION/UL (ref 3.88–5.62)
RBC MORPH BLD: PRESENT
RSV B RNA SPEC QL NAA+PROBE: ABNORMAL
RSV B RNA SPEC QL NAA+PROBE: NORMAL
S PNEUM AG UR QL: NEGATIVE
SITE: ABNORMAL
SODIUM SERPL-SCNC: 136 MMOL/L (ref 136–145)
SODIUM SERPL-SCNC: 137 MMOL/L (ref 136–145)
SODIUM SERPL-SCNC: 139 MMOL/L (ref 136–145)
SODIUM SERPL-SCNC: 140 MMOL/L (ref 136–145)
SODIUM SERPL-SCNC: 141 MMOL/L (ref 136–145)
SODIUM SERPL-SCNC: 141 MMOL/L (ref 136–145)
SODIUM SERPL-SCNC: 142 MMOL/L (ref 136–145)
SODIUM SERPL-SCNC: 142 MMOL/L (ref 136–145)
SODIUM SERPL-SCNC: 143 MMOL/L (ref 136–145)
SODIUM SERPL-SCNC: 143 MMOL/L (ref 136–145)
SODIUM SERPL-SCNC: 144 MMOL/L (ref 136–145)
SODIUM SERPL-SCNC: 145 MMOL/L (ref 136–145)
SP GR UR STRIP.AUTO: 1.01 (ref 1–1.03)
SPECIMEN SOURCE: NORMAL
T WAVE AXIS: 220 DEGREES
T WAVE AXIS: 221 DEGREES
T WAVE AXIS: 235 DEGREES
T WAVE AXIS: 237 DEGREES
T WAVE AXIS: 237 DEGREES
T WAVE AXIS: 242 DEGREES
T WAVE AXIS: 248 DEGREES
TOTAL CELLS COUNTED SPEC: 100
TROPONIN I BLD-MCNC: 0 NG/ML (ref 0–0.08)
TROPONIN I BLD-MCNC: 0 NG/ML (ref 0–0.08)
TROPONIN I BLD-MCNC: 0.01 NG/ML (ref 0–0.08)
TROPONIN I BLD-MCNC: 0.01 NG/ML (ref 0–0.08)
TROPONIN I BLD-MCNC: 0.02 NG/ML (ref 0–0.08)
TSH SERPL DL<=0.05 MIU/L-ACNC: 4.13 UIU/ML (ref 0.36–3.74)
UROBILINOGEN UR QL STRIP.AUTO: 0.2 E.U./DL
VARIANT LYMPHS # BLD AUTO: 1 %
VENTRICULAR RATE: 101 BPM
VENTRICULAR RATE: 103 BPM
VENTRICULAR RATE: 56 BPM
VENTRICULAR RATE: 67 BPM
VENTRICULAR RATE: 72 BPM
VENTRICULAR RATE: 80 BPM
VENTRICULAR RATE: 85 BPM
WBC # BLD AUTO: 10.12 THOUSAND/UL (ref 4.31–10.16)
WBC # BLD AUTO: 10.52 THOUSAND/UL (ref 4.31–10.16)
WBC # BLD AUTO: 11.44 THOUSAND/UL (ref 4.31–10.16)
WBC # BLD AUTO: 13 THOUSAND/UL (ref 4.31–10.16)
WBC # BLD AUTO: 13.23 THOUSAND/UL (ref 4.31–10.16)
WBC # BLD AUTO: 20.54 THOUSAND/UL (ref 4.31–10.16)
WBC # BLD AUTO: 3.78 THOUSAND/UL (ref 4.31–10.16)
WBC # BLD AUTO: 4.55 THOUSAND/UL (ref 4.31–10.16)
WBC # BLD AUTO: 4.69 THOUSAND/UL (ref 4.31–10.16)
WBC # BLD AUTO: 5.14 THOUSAND/UL (ref 4.31–10.16)
WBC # BLD AUTO: 5.15 THOUSAND/UL (ref 4.31–10.16)
WBC # BLD AUTO: 5.28 THOUSAND/UL (ref 4.31–10.16)
WBC # BLD AUTO: 5.7 THOUSAND/UL (ref 4.31–10.16)
WBC # BLD AUTO: 6.52 THOUSAND/UL (ref 4.31–10.16)
WBC # BLD AUTO: 6.8 THOUSAND/UL (ref 4.31–10.16)
WBC # BLD AUTO: 9.15 THOUSAND/UL (ref 4.31–10.16)
WBC # BLD AUTO: 9.18 THOUSAND/UL (ref 4.31–10.16)
WBC # FLD MANUAL: 1081 /UL

## 2017-01-01 PROCEDURE — 85025 COMPLETE CBC W/AUTO DIFF WBC: CPT | Performed by: EMERGENCY MEDICINE

## 2017-01-01 PROCEDURE — 96361 HYDRATE IV INFUSION ADD-ON: CPT

## 2017-01-01 PROCEDURE — 87205 SMEAR GRAM STAIN: CPT | Performed by: INTERNAL MEDICINE

## 2017-01-01 PROCEDURE — 73221 MRI JOINT UPR EXTREM W/O DYE: CPT

## 2017-01-01 PROCEDURE — 80048 BASIC METABOLIC PNL TOTAL CA: CPT | Performed by: PHYSICIAN ASSISTANT

## 2017-01-01 PROCEDURE — 80053 COMPREHEN METABOLIC PANEL: CPT | Performed by: EMERGENCY MEDICINE

## 2017-01-01 PROCEDURE — 99152 MOD SED SAME PHYS/QHP 5/>YRS: CPT

## 2017-01-01 PROCEDURE — 94760 N-INVAS EAR/PLS OXIMETRY 1: CPT

## 2017-01-01 PROCEDURE — 87400 INFLUENZA A/B EACH AG IA: CPT | Performed by: EMERGENCY MEDICINE

## 2017-01-01 PROCEDURE — 97163 PT EVAL HIGH COMPLEX 45 MIN: CPT | Performed by: PHYSICAL THERAPIST

## 2017-01-01 PROCEDURE — 99285 EMERGENCY DEPT VISIT HI MDM: CPT

## 2017-01-01 PROCEDURE — 0WPBX0Z REMOVAL OF DRAINAGE DEVICE FROM LEFT PLEURAL CAVITY, EXTERNAL APPROACH: ICD-10-PCS | Performed by: RADIOLOGY

## 2017-01-01 PROCEDURE — 94640 AIRWAY INHALATION TREATMENT: CPT

## 2017-01-01 PROCEDURE — 93005 ELECTROCARDIOGRAM TRACING: CPT

## 2017-01-01 PROCEDURE — G8980 MOBILITY D/C STATUS: HCPCS | Performed by: PHYSICAL THERAPIST

## 2017-01-01 PROCEDURE — 94668 MNPJ CHEST WALL SBSQ: CPT

## 2017-01-01 PROCEDURE — 99153 MOD SED SAME PHYS/QHP EA: CPT

## 2017-01-01 PROCEDURE — 0W993ZX DRAINAGE OF RIGHT PLEURAL CAVITY, PERCUTANEOUS APPROACH, DIAGNOSTIC: ICD-10-PCS | Performed by: PHYSICIAN ASSISTANT

## 2017-01-01 PROCEDURE — 84484 ASSAY OF TROPONIN QUANT: CPT

## 2017-01-01 PROCEDURE — 96365 THER/PROPH/DIAG IV INF INIT: CPT

## 2017-01-01 PROCEDURE — 0W9B3ZZ DRAINAGE OF LEFT PLEURAL CAVITY, PERCUTANEOUS APPROACH: ICD-10-PCS | Performed by: INTERNAL MEDICINE

## 2017-01-01 PROCEDURE — 82042 OTHER SOURCE ALBUMIN QUAN EA: CPT | Performed by: INTERNAL MEDICINE

## 2017-01-01 PROCEDURE — 87070 CULTURE OTHR SPECIMN AEROBIC: CPT | Performed by: HOSPITALIST

## 2017-01-01 PROCEDURE — 71275 CT ANGIOGRAPHY CHEST: CPT

## 2017-01-01 PROCEDURE — 87040 BLOOD CULTURE FOR BACTERIA: CPT | Performed by: EMERGENCY MEDICINE

## 2017-01-01 PROCEDURE — 80048 BASIC METABOLIC PNL TOTAL CA: CPT

## 2017-01-01 PROCEDURE — 94660 CPAP INITIATION&MGMT: CPT

## 2017-01-01 PROCEDURE — 80048 BASIC METABOLIC PNL TOTAL CA: CPT | Performed by: EMERGENCY MEDICINE

## 2017-01-01 PROCEDURE — 93005 ELECTROCARDIOGRAM TRACING: CPT | Performed by: EMERGENCY MEDICINE

## 2017-01-01 PROCEDURE — 85007 BL SMEAR W/DIFF WBC COUNT: CPT | Performed by: INTERNAL MEDICINE

## 2017-01-01 PROCEDURE — 36415 COLL VENOUS BLD VENIPUNCTURE: CPT | Performed by: EMERGENCY MEDICINE

## 2017-01-01 PROCEDURE — 71020 HB CHEST X-RAY 2VW FRONTAL&LATL: CPT

## 2017-01-01 PROCEDURE — 85049 AUTOMATED PLATELET COUNT: CPT | Performed by: PHYSICIAN ASSISTANT

## 2017-01-01 PROCEDURE — 80048 BASIC METABOLIC PNL TOTAL CA: CPT | Performed by: HOSPITALIST

## 2017-01-01 PROCEDURE — 85027 COMPLETE CBC AUTOMATED: CPT | Performed by: PHYSICIAN ASSISTANT

## 2017-01-01 PROCEDURE — 87449 NOS EACH ORGANISM AG IA: CPT | Performed by: HOSPITALIST

## 2017-01-01 PROCEDURE — 94668 MNPJ CHEST WALL SBSQ: CPT | Performed by: SOCIAL WORKER

## 2017-01-01 PROCEDURE — 32555 ASPIRATE PLEURA W/ IMAGING: CPT

## 2017-01-01 PROCEDURE — 75989 ABSCESS DRAINAGE UNDER X-RAY: CPT

## 2017-01-01 PROCEDURE — 85027 COMPLETE CBC AUTOMATED: CPT | Performed by: HOSPITALIST

## 2017-01-01 PROCEDURE — 97116 GAIT TRAINING THERAPY: CPT

## 2017-01-01 PROCEDURE — G8979 MOBILITY GOAL STATUS: HCPCS

## 2017-01-01 PROCEDURE — 96374 THER/PROPH/DIAG INJ IV PUSH: CPT

## 2017-01-01 PROCEDURE — G8978 MOBILITY CURRENT STATUS: HCPCS | Performed by: PHYSICAL THERAPIST

## 2017-01-01 PROCEDURE — 85027 COMPLETE CBC AUTOMATED: CPT | Performed by: INTERNAL MEDICINE

## 2017-01-01 PROCEDURE — 96375 TX/PRO/DX INJ NEW DRUG ADDON: CPT

## 2017-01-01 PROCEDURE — 97165 OT EVAL LOW COMPLEX 30 MIN: CPT

## 2017-01-01 PROCEDURE — 94760 N-INVAS EAR/PLS OXIMETRY 1: CPT | Performed by: SOCIAL WORKER

## 2017-01-01 PROCEDURE — 83605 ASSAY OF LACTIC ACID: CPT | Performed by: EMERGENCY MEDICINE

## 2017-01-01 PROCEDURE — 89051 BODY FLUID CELL COUNT: CPT | Performed by: INTERNAL MEDICINE

## 2017-01-01 PROCEDURE — 85610 PROTHROMBIN TIME: CPT | Performed by: PHYSICIAN ASSISTANT

## 2017-01-01 PROCEDURE — 80053 COMPREHEN METABOLIC PANEL: CPT | Performed by: INTERNAL MEDICINE

## 2017-01-01 PROCEDURE — 0W9B30Z DRAINAGE OF LEFT PLEURAL CAVITY WITH DRAINAGE DEVICE, PERCUTANEOUS APPROACH: ICD-10-PCS | Performed by: RADIOLOGY

## 2017-01-01 PROCEDURE — 32550 INSERT PLEURAL CATH: CPT

## 2017-01-01 PROCEDURE — 85610 PROTHROMBIN TIME: CPT | Performed by: EMERGENCY MEDICINE

## 2017-01-01 PROCEDURE — 88305 TISSUE EXAM BY PATHOLOGIST: CPT | Performed by: INTERNAL MEDICINE

## 2017-01-01 PROCEDURE — 96413 CHEMO IV INFUSION 1 HR: CPT

## 2017-01-01 PROCEDURE — 80048 BASIC METABOLIC PNL TOTAL CA: CPT | Performed by: INTERNAL MEDICINE

## 2017-01-01 PROCEDURE — 94640 AIRWAY INHALATION TREATMENT: CPT | Performed by: SOCIAL WORKER

## 2017-01-01 PROCEDURE — 87070 CULTURE OTHR SPECIMN AEROBIC: CPT | Performed by: INTERNAL MEDICINE

## 2017-01-01 PROCEDURE — G8988 SELF CARE GOAL STATUS: HCPCS

## 2017-01-01 PROCEDURE — 85025 COMPLETE CBC W/AUTO DIFF WBC: CPT

## 2017-01-01 PROCEDURE — 97163 PT EVAL HIGH COMPLEX 45 MIN: CPT

## 2017-01-01 PROCEDURE — 81003 URINALYSIS AUTO W/O SCOPE: CPT | Performed by: HOSPITALIST

## 2017-01-01 PROCEDURE — 87046 STOOL CULTR AEROBIC BACT EA: CPT | Performed by: PHYSICIAN ASSISTANT

## 2017-01-01 PROCEDURE — 87798 DETECT AGENT NOS DNA AMP: CPT | Performed by: INTERNAL MEDICINE

## 2017-01-01 PROCEDURE — 94669 MECHANICAL CHEST WALL OSCILL: CPT

## 2017-01-01 PROCEDURE — 85049 AUTOMATED PLATELET COUNT: CPT | Performed by: HOSPITALIST

## 2017-01-01 PROCEDURE — G8978 MOBILITY CURRENT STATUS: HCPCS

## 2017-01-01 PROCEDURE — 87015 SPECIMEN INFECT AGNT CONCNTJ: CPT | Performed by: PHYSICIAN ASSISTANT

## 2017-01-01 PROCEDURE — 88341 IMHCHEM/IMCYTCHM EA ADD ANTB: CPT | Performed by: INTERNAL MEDICINE

## 2017-01-01 PROCEDURE — 87077 CULTURE AEROBIC IDENTIFY: CPT | Performed by: INTERNAL MEDICINE

## 2017-01-01 PROCEDURE — 71250 CT THORAX DX C-: CPT

## 2017-01-01 PROCEDURE — 85025 COMPLETE CBC W/AUTO DIFF WBC: CPT | Performed by: PHYSICIAN ASSISTANT

## 2017-01-01 PROCEDURE — G8979 MOBILITY GOAL STATUS: HCPCS | Performed by: PHYSICAL THERAPIST

## 2017-01-01 PROCEDURE — 87493 C DIFF AMPLIFIED PROBE: CPT | Performed by: HOSPITALIST

## 2017-01-01 PROCEDURE — 87899 AGENT NOS ASSAY W/OPTIC: CPT | Performed by: PHYSICIAN ASSISTANT

## 2017-01-01 PROCEDURE — 88342 IMHCHEM/IMCYTCHM 1ST ANTB: CPT | Performed by: INTERNAL MEDICINE

## 2017-01-01 PROCEDURE — 87798 DETECT AGENT NOS DNA AMP: CPT | Performed by: EMERGENCY MEDICINE

## 2017-01-01 PROCEDURE — 80053 COMPREHEN METABOLIC PANEL: CPT | Performed by: PHYSICIAN ASSISTANT

## 2017-01-01 PROCEDURE — C1894 INTRO/SHEATH, NON-LASER: HCPCS

## 2017-01-01 PROCEDURE — C1729 CATH, DRAINAGE: HCPCS

## 2017-01-01 PROCEDURE — 87205 SMEAR GRAM STAIN: CPT | Performed by: HOSPITALIST

## 2017-01-01 PROCEDURE — 85730 THROMBOPLASTIN TIME PARTIAL: CPT | Performed by: EMERGENCY MEDICINE

## 2017-01-01 PROCEDURE — 83880 ASSAY OF NATRIURETIC PEPTIDE: CPT | Performed by: EMERGENCY MEDICINE

## 2017-01-01 PROCEDURE — 74177 CT ABD & PELVIS W/CONTRAST: CPT

## 2017-01-01 PROCEDURE — 87186 SC STD MICRODIL/AGAR DIL: CPT | Performed by: INTERNAL MEDICINE

## 2017-01-01 PROCEDURE — 85025 COMPLETE CBC W/AUTO DIFF WBC: CPT | Performed by: INTERNAL MEDICINE

## 2017-01-01 PROCEDURE — 88112 CYTOPATH CELL ENHANCE TECH: CPT | Performed by: INTERNAL MEDICINE

## 2017-01-01 PROCEDURE — 97530 THERAPEUTIC ACTIVITIES: CPT

## 2017-01-01 PROCEDURE — 83986 ASSAY PH BODY FLUID NOS: CPT | Performed by: INTERNAL MEDICINE

## 2017-01-01 PROCEDURE — 82945 GLUCOSE OTHER FLUID: CPT | Performed by: INTERNAL MEDICINE

## 2017-01-01 PROCEDURE — 32552 REMOVE LUNG CATHETER: CPT

## 2017-01-01 PROCEDURE — 84443 ASSAY THYROID STIM HORMONE: CPT | Performed by: INTERNAL MEDICINE

## 2017-01-01 PROCEDURE — 87045 FECES CULTURE AEROBIC BACT: CPT | Performed by: PHYSICIAN ASSISTANT

## 2017-01-01 PROCEDURE — G8989 SELF CARE D/C STATUS: HCPCS

## 2017-01-01 PROCEDURE — G8987 SELF CARE CURRENT STATUS: HCPCS

## 2017-01-01 PROCEDURE — 96360 HYDRATION IV INFUSION INIT: CPT

## 2017-01-01 PROCEDURE — 84157 ASSAY OF PROTEIN OTHER: CPT | Performed by: INTERNAL MEDICINE

## 2017-01-01 PROCEDURE — 83735 ASSAY OF MAGNESIUM: CPT | Performed by: INTERNAL MEDICINE

## 2017-01-01 PROCEDURE — C1769 GUIDE WIRE: HCPCS

## 2017-01-01 RX ORDER — SODIUM CHLORIDE FOR INHALATION 0.9 %
VIAL, NEBULIZER (ML) INHALATION
Status: DISPENSED
Start: 2017-01-01 | End: 2017-01-01

## 2017-01-01 RX ORDER — HEPARIN SODIUM 5000 [USP'U]/ML
5000 INJECTION, SOLUTION INTRAVENOUS; SUBCUTANEOUS EVERY 8 HOURS SCHEDULED
Status: DISCONTINUED | OUTPATIENT
Start: 2017-01-01 | End: 2017-01-01 | Stop reason: HOSPADM

## 2017-01-01 RX ORDER — LEVALBUTEROL INHALATION SOLUTION 0.63 MG/3ML
0.63 SOLUTION RESPIRATORY (INHALATION)
Status: DISCONTINUED | OUTPATIENT
Start: 2017-01-01 | End: 2017-01-01 | Stop reason: HOSPADM

## 2017-01-01 RX ORDER — PREDNISONE 20 MG/1
40 TABLET ORAL DAILY
Status: DISCONTINUED | OUTPATIENT
Start: 2017-01-01 | End: 2017-01-01

## 2017-01-01 RX ORDER — HYDROCODONE POLISTIREX AND CHLORPHENIRAMINE POLISTIREX 10; 8 MG/5ML; MG/5ML
5 SUSPENSION, EXTENDED RELEASE ORAL EVERY 12 HOURS PRN
Status: DISCONTINUED | OUTPATIENT
Start: 2017-01-01 | End: 2017-01-01 | Stop reason: HOSPADM

## 2017-01-01 RX ORDER — NITROGLYCERIN 0.4 MG/1
0.4 TABLET SUBLINGUAL
Status: DISCONTINUED | OUTPATIENT
Start: 2017-01-01 | End: 2017-01-01 | Stop reason: HOSPADM

## 2017-01-01 RX ORDER — LORAZEPAM 1 MG/1
1 TABLET ORAL EVERY 4 HOURS PRN
Status: DISCONTINUED | OUTPATIENT
Start: 2017-01-01 | End: 2017-01-01

## 2017-01-01 RX ORDER — ALBUTEROL SULFATE 90 UG/1
2 AEROSOL, METERED RESPIRATORY (INHALATION) EVERY 4 HOURS PRN
Status: DISCONTINUED | OUTPATIENT
Start: 2017-01-01 | End: 2017-01-01 | Stop reason: HOSPADM

## 2017-01-01 RX ORDER — PREDNISONE 20 MG/1
40 TABLET ORAL DAILY
Status: DISCONTINUED | OUTPATIENT
Start: 2017-01-01 | End: 2017-01-01 | Stop reason: HOSPADM

## 2017-01-01 RX ORDER — SODIUM CHLORIDE 9 MG/ML
75 INJECTION, SOLUTION INTRAVENOUS CONTINUOUS
Status: DISCONTINUED | OUTPATIENT
Start: 2017-01-01 | End: 2017-01-01

## 2017-01-01 RX ORDER — SODIUM CHLORIDE 9 MG/ML
75 INJECTION, SOLUTION INTRAVENOUS CONTINUOUS
Status: DISPENSED | OUTPATIENT
Start: 2017-01-01 | End: 2017-01-01

## 2017-01-01 RX ORDER — GABAPENTIN 300 MG/1
300 CAPSULE ORAL 3 TIMES DAILY
Status: DISCONTINUED | OUTPATIENT
Start: 2017-01-01 | End: 2017-01-01

## 2017-01-01 RX ORDER — FUROSEMIDE 10 MG/ML
20 INJECTION INTRAMUSCULAR; INTRAVENOUS ONCE
Status: COMPLETED | OUTPATIENT
Start: 2017-01-01 | End: 2017-01-01

## 2017-01-01 RX ORDER — NITROGLYCERIN 0.4 MG/1
0.4 TABLET SUBLINGUAL
Status: DISCONTINUED | OUTPATIENT
Start: 2017-01-01 | End: 2017-01-01

## 2017-01-01 RX ORDER — GABAPENTIN 300 MG/1
300 CAPSULE ORAL 3 TIMES DAILY
Status: DISCONTINUED | OUTPATIENT
Start: 2017-01-01 | End: 2017-01-01 | Stop reason: HOSPADM

## 2017-01-01 RX ORDER — TRAZODONE HYDROCHLORIDE 50 MG/1
50 TABLET ORAL
Status: DISCONTINUED | OUTPATIENT
Start: 2017-01-01 | End: 2017-01-01

## 2017-01-01 RX ORDER — ACETAMINOPHEN 325 MG/1
TABLET ORAL
Status: DISPENSED
Start: 2017-01-01 | End: 2017-01-01

## 2017-01-01 RX ORDER — POTASSIUM CHLORIDE 14.9 MG/ML
20 INJECTION INTRAVENOUS ONCE
Status: COMPLETED | OUTPATIENT
Start: 2017-01-01 | End: 2017-01-01

## 2017-01-01 RX ORDER — METHYLPREDNISOLONE SODIUM SUCCINATE 40 MG/ML
40 INJECTION, POWDER, LYOPHILIZED, FOR SOLUTION INTRAMUSCULAR; INTRAVENOUS EVERY 12 HOURS SCHEDULED
Status: DISCONTINUED | OUTPATIENT
Start: 2017-01-01 | End: 2017-01-01

## 2017-01-01 RX ORDER — ASPIRIN 81 MG/1
81 TABLET ORAL DAILY
Status: DISCONTINUED | OUTPATIENT
Start: 2017-01-01 | End: 2017-01-01 | Stop reason: HOSPADM

## 2017-01-01 RX ORDER — ACETYLCYSTEINE 200 MG/ML
3 SOLUTION ORAL; RESPIRATORY (INHALATION)
Status: COMPLETED | OUTPATIENT
Start: 2017-01-01 | End: 2017-01-01

## 2017-01-01 RX ORDER — TAMSULOSIN HYDROCHLORIDE 0.4 MG/1
0.4 CAPSULE ORAL
Status: DISCONTINUED | OUTPATIENT
Start: 2017-01-01 | End: 2017-01-01 | Stop reason: HOSPADM

## 2017-01-01 RX ORDER — METHYLPREDNISOLONE SODIUM SUCCINATE 40 MG/ML
40 INJECTION, POWDER, LYOPHILIZED, FOR SOLUTION INTRAMUSCULAR; INTRAVENOUS EVERY 8 HOURS SCHEDULED
Status: DISCONTINUED | OUTPATIENT
Start: 2017-01-01 | End: 2017-01-01

## 2017-01-01 RX ORDER — FINASTERIDE 5 MG/1
5 TABLET, FILM COATED ORAL DAILY
Status: DISCONTINUED | OUTPATIENT
Start: 2017-01-01 | End: 2017-01-01 | Stop reason: HOSPADM

## 2017-01-01 RX ORDER — OXYCODONE HYDROCHLORIDE 10 MG/1
10 TABLET ORAL EVERY 4 HOURS PRN
Status: DISCONTINUED | OUTPATIENT
Start: 2017-01-01 | End: 2017-01-01 | Stop reason: HOSPADM

## 2017-01-01 RX ORDER — OLANZAPINE 5 MG/1
5 TABLET ORAL
Status: DISCONTINUED | OUTPATIENT
Start: 2017-01-01 | End: 2017-01-01 | Stop reason: HOSPADM

## 2017-01-01 RX ORDER — LEVALBUTEROL 1.25 MG/.5ML
1.25 SOLUTION, CONCENTRATE RESPIRATORY (INHALATION)
Status: DISCONTINUED | OUTPATIENT
Start: 2017-01-01 | End: 2017-01-01

## 2017-01-01 RX ORDER — CITALOPRAM 20 MG/1
20 TABLET ORAL DAILY
Status: DISCONTINUED | OUTPATIENT
Start: 2017-01-01 | End: 2017-01-01 | Stop reason: HOSPADM

## 2017-01-01 RX ORDER — PREDNISONE 20 MG/1
20 TABLET ORAL DAILY
Qty: 2 TABLET | Refills: 0 | Status: SHIPPED | OUTPATIENT
Start: 2017-01-01 | End: 2017-01-01

## 2017-01-01 RX ORDER — METHYLPREDNISOLONE SODIUM SUCCINATE 125 MG/2ML
60 INJECTION, POWDER, LYOPHILIZED, FOR SOLUTION INTRAMUSCULAR; INTRAVENOUS DAILY
Status: DISCONTINUED | OUTPATIENT
Start: 2017-01-01 | End: 2017-01-01

## 2017-01-01 RX ORDER — LEVALBUTEROL 1.25 MG/.5ML
1.25 SOLUTION, CONCENTRATE RESPIRATORY (INHALATION)
Status: DISCONTINUED | OUTPATIENT
Start: 2017-01-01 | End: 2017-01-01 | Stop reason: HOSPADM

## 2017-01-01 RX ORDER — FLUTICASONE PROPIONATE 50 MCG
1 SPRAY, SUSPENSION (ML) NASAL DAILY
Status: DISCONTINUED | OUTPATIENT
Start: 2017-01-01 | End: 2017-01-01 | Stop reason: HOSPADM

## 2017-01-01 RX ORDER — TAMSULOSIN HYDROCHLORIDE 0.4 MG/1
0.4 CAPSULE ORAL
Status: DISCONTINUED | OUTPATIENT
Start: 2017-01-01 | End: 2017-01-01

## 2017-01-01 RX ORDER — ATORVASTATIN CALCIUM 40 MG/1
40 TABLET, FILM COATED ORAL EVERY EVENING
Status: DISCONTINUED | OUTPATIENT
Start: 2017-01-01 | End: 2017-01-01

## 2017-01-01 RX ORDER — OXYCODONE HYDROCHLORIDE 5 MG/1
5 TABLET ORAL EVERY 4 HOURS PRN
Status: DISCONTINUED | OUTPATIENT
Start: 2017-01-01 | End: 2017-01-01 | Stop reason: HOSPADM

## 2017-01-01 RX ORDER — ACETAMINOPHEN 325 MG/1
975 TABLET ORAL EVERY 8 HOURS SCHEDULED
Status: DISCONTINUED | OUTPATIENT
Start: 2017-01-01 | End: 2017-01-01

## 2017-01-01 RX ORDER — OLANZAPINE 5 MG/1
5 TABLET ORAL
Status: DISCONTINUED | OUTPATIENT
Start: 2017-01-01 | End: 2017-01-01

## 2017-01-01 RX ORDER — DIPHENHYDRAMINE HYDROCHLORIDE 50 MG/ML
25 INJECTION INTRAMUSCULAR; INTRAVENOUS
Status: DISCONTINUED | OUTPATIENT
Start: 2017-01-01 | End: 2017-01-01

## 2017-01-01 RX ORDER — DIFLUPREDNATE 0.5 MG/ML
1 EMULSION OPHTHALMIC 2 TIMES DAILY
Status: DISCONTINUED | OUTPATIENT
Start: 2017-01-01 | End: 2017-01-01

## 2017-01-01 RX ORDER — METHADONE HYDROCHLORIDE 5 MG/5ML
5 SOLUTION ORAL DAILY PRN
Status: DISCONTINUED | OUTPATIENT
Start: 2017-01-01 | End: 2017-01-01

## 2017-01-01 RX ORDER — LEVALBUTEROL 1.25 MG/.5ML
SOLUTION, CONCENTRATE RESPIRATORY (INHALATION)
Status: COMPLETED
Start: 2017-01-01 | End: 2017-01-01

## 2017-01-01 RX ORDER — PREDNISONE 20 MG/1
20 TABLET ORAL DAILY
Status: DISCONTINUED | OUTPATIENT
Start: 2017-01-01 | End: 2017-01-01

## 2017-01-01 RX ORDER — SODIUM CHLORIDE FOR INHALATION 0.9 %
3 VIAL, NEBULIZER (ML) INHALATION
Status: DISCONTINUED | OUTPATIENT
Start: 2017-01-01 | End: 2017-01-01 | Stop reason: HOSPADM

## 2017-01-01 RX ORDER — PREDNISONE 20 MG/1
TABLET ORAL
Qty: 8 TABLET | Refills: 0 | Status: SHIPPED | OUTPATIENT
Start: 2017-01-01 | End: 2017-01-01 | Stop reason: HOSPADM

## 2017-01-01 RX ORDER — ONDANSETRON 2 MG/ML
4 INJECTION INTRAMUSCULAR; INTRAVENOUS ONCE
Status: COMPLETED | OUTPATIENT
Start: 2017-01-01 | End: 2017-01-01

## 2017-01-01 RX ORDER — METHYLPREDNISOLONE SODIUM SUCCINATE 40 MG/ML
40 INJECTION, POWDER, LYOPHILIZED, FOR SOLUTION INTRAMUSCULAR; INTRAVENOUS ONCE
Status: COMPLETED | OUTPATIENT
Start: 2017-01-01 | End: 2017-01-01

## 2017-01-01 RX ORDER — DEXAMETHASONE 4 MG/1
8 TABLET ORAL EVERY 8 HOURS SCHEDULED
Status: DISCONTINUED | OUTPATIENT
Start: 2017-01-01 | End: 2017-01-01

## 2017-01-01 RX ORDER — FUROSEMIDE 10 MG/ML
20 INJECTION INTRAMUSCULAR; INTRAVENOUS
Status: DISCONTINUED | OUTPATIENT
Start: 2017-01-01 | End: 2017-01-01

## 2017-01-01 RX ORDER — SIMETHICONE 80 MG
80 TABLET,CHEWABLE ORAL EVERY 6 HOURS PRN
Status: DISCONTINUED | OUTPATIENT
Start: 2017-01-01 | End: 2017-01-01 | Stop reason: HOSPADM

## 2017-01-01 RX ORDER — ALBUTEROL SULFATE 90 UG/1
2 AEROSOL, METERED RESPIRATORY (INHALATION) EVERY 4 HOURS PRN
Status: DISCONTINUED | OUTPATIENT
Start: 2017-01-01 | End: 2017-01-01

## 2017-01-01 RX ORDER — OXYBUTYNIN CHLORIDE 5 MG/1
10 TABLET, EXTENDED RELEASE ORAL
Status: DISCONTINUED | OUTPATIENT
Start: 2017-01-01 | End: 2017-01-01 | Stop reason: HOSPADM

## 2017-01-01 RX ORDER — SODIUM CHLORIDE FOR INHALATION 0.9 %
3 VIAL, NEBULIZER (ML) INHALATION EVERY 4 HOURS PRN
Status: DISCONTINUED | OUTPATIENT
Start: 2017-01-01 | End: 2017-01-01

## 2017-01-01 RX ORDER — LEVALBUTEROL 1.25 MG/.5ML
SOLUTION, CONCENTRATE RESPIRATORY (INHALATION)
Status: DISPENSED
Start: 2017-01-01 | End: 2017-01-01

## 2017-01-01 RX ORDER — OXYCODONE HYDROCHLORIDE 10 MG/1
30 TABLET ORAL
Status: DISCONTINUED | OUTPATIENT
Start: 2017-01-01 | End: 2017-01-01

## 2017-01-01 RX ORDER — ASPIRIN 81 MG/1
324 TABLET, CHEWABLE ORAL ONCE
Status: DISCONTINUED | OUTPATIENT
Start: 2017-01-01 | End: 2017-01-01

## 2017-01-01 RX ORDER — OXYCODONE HYDROCHLORIDE 10 MG/1
30 TABLET ORAL EVERY 4 HOURS
Status: DISCONTINUED | OUTPATIENT
Start: 2017-01-01 | End: 2017-01-01

## 2017-01-01 RX ORDER — PREDNISONE 10 MG/1
TABLET ORAL
Qty: 30 TABLET | Refills: 0 | Status: SHIPPED | OUTPATIENT
Start: 2017-01-01 | End: 2017-01-01 | Stop reason: HOSPADM

## 2017-01-01 RX ORDER — HYDROCODONE POLISTIREX AND CHLORPHENIRAMINE POLISTIREX 10; 8 MG/5ML; MG/5ML
5 SUSPENSION, EXTENDED RELEASE ORAL EVERY 12 HOURS PRN
Qty: 120 ML | Refills: 0 | Status: SHIPPED | OUTPATIENT
Start: 2017-01-01 | End: 2017-01-01

## 2017-01-01 RX ORDER — POTASSIUM CHLORIDE 20 MEQ/1
20 TABLET, EXTENDED RELEASE ORAL DAILY
Status: DISCONTINUED | OUTPATIENT
Start: 2017-01-01 | End: 2017-01-01 | Stop reason: HOSPADM

## 2017-01-01 RX ORDER — TRAZODONE HYDROCHLORIDE 50 MG/1
50 TABLET ORAL
Status: DISCONTINUED | OUTPATIENT
Start: 2017-01-01 | End: 2017-01-01 | Stop reason: HOSPADM

## 2017-01-01 RX ORDER — MAGNESIUM HYDROXIDE/ALUMINUM HYDROXICE/SIMETHICONE 120; 1200; 1200 MG/30ML; MG/30ML; MG/30ML
30 SUSPENSION ORAL EVERY 6 HOURS PRN
Status: DISCONTINUED | OUTPATIENT
Start: 2017-01-01 | End: 2017-01-01

## 2017-01-01 RX ORDER — FLUTICASONE PROPIONATE 50 MCG
1 SPRAY, SUSPENSION (ML) NASAL DAILY
Status: DISCONTINUED | OUTPATIENT
Start: 2017-01-01 | End: 2017-01-01

## 2017-01-01 RX ORDER — LEVALBUTEROL INHALATION SOLUTION 0.63 MG/3ML
0.63 SOLUTION RESPIRATORY (INHALATION) EVERY 6 HOURS
Status: DISCONTINUED | OUTPATIENT
Start: 2017-01-01 | End: 2017-01-01

## 2017-01-01 RX ORDER — MORPHINE SULFATE 2 MG/ML
2 INJECTION, SOLUTION INTRAMUSCULAR; INTRAVENOUS
Status: DISCONTINUED | OUTPATIENT
Start: 2017-01-01 | End: 2017-01-01 | Stop reason: HOSPADM

## 2017-01-01 RX ORDER — SODIUM CHLORIDE 9 MG/ML
125 INJECTION, SOLUTION INTRAVENOUS CONTINUOUS
Status: DISCONTINUED | OUTPATIENT
Start: 2017-01-01 | End: 2017-01-01

## 2017-01-01 RX ORDER — POTASSIUM CHLORIDE 20 MEQ/1
20 TABLET, EXTENDED RELEASE ORAL ONCE
Status: COMPLETED | OUTPATIENT
Start: 2017-01-01 | End: 2017-01-01

## 2017-01-01 RX ORDER — POTASSIUM CHLORIDE 20 MEQ/1
40 TABLET, EXTENDED RELEASE ORAL ONCE
Status: COMPLETED | OUTPATIENT
Start: 2017-01-01 | End: 2017-01-01

## 2017-01-01 RX ORDER — MIDAZOLAM HYDROCHLORIDE 1 MG/ML
INJECTION INTRAMUSCULAR; INTRAVENOUS CODE/TRAUMA/SEDATION MEDICATION
Status: COMPLETED | OUTPATIENT
Start: 2017-01-01 | End: 2017-01-01

## 2017-01-01 RX ORDER — DIFLUPREDNATE 0.5 MG/ML
1 EMULSION OPHTHALMIC 2 TIMES DAILY
COMMUNITY
End: 2017-01-01 | Stop reason: HOSPADM

## 2017-01-01 RX ORDER — DOCUSATE SODIUM 100 MG/1
100 CAPSULE, LIQUID FILLED ORAL 2 TIMES DAILY
Status: DISCONTINUED | OUTPATIENT
Start: 2017-01-01 | End: 2017-01-01 | Stop reason: HOSPADM

## 2017-01-01 RX ORDER — OXYCODONE HYDROCHLORIDE 10 MG/1
10 TABLET ORAL EVERY 4 HOURS PRN
Status: DISCONTINUED | OUTPATIENT
Start: 2017-01-01 | End: 2017-01-01

## 2017-01-01 RX ORDER — PREDNISOLONE ACETATE 10 MG/ML
1 SUSPENSION/ DROPS OPHTHALMIC 2 TIMES DAILY
Status: DISCONTINUED | OUTPATIENT
Start: 2017-01-01 | End: 2017-01-01 | Stop reason: HOSPADM

## 2017-01-01 RX ORDER — PROCHLORPERAZINE MALEATE 10 MG
10 TABLET ORAL EVERY 6 HOURS PRN
Status: DISCONTINUED | OUTPATIENT
Start: 2017-01-01 | End: 2017-01-01

## 2017-01-01 RX ORDER — ONDANSETRON 2 MG/ML
INJECTION INTRAMUSCULAR; INTRAVENOUS
Status: DISPENSED
Start: 2017-01-01 | End: 2017-01-01

## 2017-01-01 RX ORDER — PREDNISONE 10 MG/1
10 TABLET ORAL DAILY
Qty: 2 TABLET | Refills: 0 | Status: SHIPPED | OUTPATIENT
Start: 2017-01-01 | End: 2017-01-01

## 2017-01-01 RX ORDER — PREDNISONE 20 MG/1
60 TABLET ORAL DAILY
Status: DISCONTINUED | OUTPATIENT
Start: 2017-01-01 | End: 2017-01-01

## 2017-01-01 RX ORDER — OXYBUTYNIN CHLORIDE 5 MG/1
10 TABLET, EXTENDED RELEASE ORAL DAILY PRN
Status: DISCONTINUED | OUTPATIENT
Start: 2017-01-01 | End: 2017-01-01 | Stop reason: HOSPADM

## 2017-01-01 RX ORDER — ACETAMINOPHEN 325 MG/1
650 TABLET ORAL EVERY 6 HOURS PRN
Status: DISCONTINUED | OUTPATIENT
Start: 2017-01-01 | End: 2017-01-01 | Stop reason: HOSPADM

## 2017-01-01 RX ORDER — ALBUTEROL SULFATE 90 UG/1
1 AEROSOL, METERED RESPIRATORY (INHALATION) EVERY 4 HOURS PRN
Status: DISCONTINUED | OUTPATIENT
Start: 2017-01-01 | End: 2017-01-01

## 2017-01-01 RX ORDER — GUAIFENESIN 1200 MG/1
1200 TABLET, EXTENDED RELEASE ORAL EVERY 12 HOURS SCHEDULED
Qty: 60 TABLET | Refills: 0 | Status: SHIPPED | OUTPATIENT
Start: 2017-01-01 | End: 2017-01-01

## 2017-01-01 RX ORDER — DIFLUPREDNATE 0.5 MG/ML
1 EMULSION OPHTHALMIC 2 TIMES DAILY
Status: DISCONTINUED | OUTPATIENT
Start: 2017-01-01 | End: 2017-01-01 | Stop reason: HOSPADM

## 2017-01-01 RX ORDER — METHYLPREDNISOLONE SODIUM SUCCINATE 40 MG/ML
40 INJECTION, POWDER, LYOPHILIZED, FOR SOLUTION INTRAMUSCULAR; INTRAVENOUS EVERY 12 HOURS SCHEDULED
Status: COMPLETED | OUTPATIENT
Start: 2017-01-01 | End: 2017-01-01

## 2017-01-01 RX ORDER — OLANZAPINE 2.5 MG/1
2.5 TABLET ORAL
Status: DISCONTINUED | OUTPATIENT
Start: 2017-01-01 | End: 2017-01-01

## 2017-01-01 RX ORDER — LORAZEPAM 1 MG/1
1 TABLET ORAL EVERY 4 HOURS
Status: DISCONTINUED | OUTPATIENT
Start: 2017-01-01 | End: 2017-01-01

## 2017-01-01 RX ORDER — MORPHINE SULFATE 2 MG/ML
2 INJECTION, SOLUTION INTRAMUSCULAR; INTRAVENOUS
Status: DISCONTINUED | OUTPATIENT
Start: 2017-01-01 | End: 2017-01-01

## 2017-01-01 RX ORDER — ONDANSETRON 2 MG/ML
4 INJECTION INTRAMUSCULAR; INTRAVENOUS EVERY 6 HOURS PRN
Status: DISCONTINUED | OUTPATIENT
Start: 2017-01-01 | End: 2017-01-01 | Stop reason: HOSPADM

## 2017-01-01 RX ORDER — POTASSIUM CHLORIDE 750 MG/1
20 TABLET, EXTENDED RELEASE ORAL DAILY
Status: DISCONTINUED | OUTPATIENT
Start: 2017-01-01 | End: 2017-01-01

## 2017-01-01 RX ORDER — SODIUM CHLORIDE 9 MG/ML
75 INJECTION, SOLUTION INTRAVENOUS CONTINUOUS
Status: DISCONTINUED | OUTPATIENT
Start: 2017-01-01 | End: 2017-01-01 | Stop reason: HOSPADM

## 2017-01-01 RX ORDER — GUAIFENESIN 600 MG
1200 TABLET, EXTENDED RELEASE 12 HR ORAL EVERY 12 HOURS SCHEDULED
Status: DISCONTINUED | OUTPATIENT
Start: 2017-01-01 | End: 2017-01-01 | Stop reason: HOSPADM

## 2017-01-01 RX ORDER — LIDOCAINE 50 MG/G
1 PATCH TOPICAL DAILY
Status: DISCONTINUED | OUTPATIENT
Start: 2017-01-01 | End: 2017-01-01

## 2017-01-01 RX ORDER — OLANZAPINE 5 MG/1
5 TABLET ORAL
Qty: 30 TABLET | Refills: 0 | Status: SHIPPED | OUTPATIENT
Start: 2017-01-01 | End: 2017-01-01 | Stop reason: HOSPADM

## 2017-01-01 RX ORDER — ATORVASTATIN CALCIUM 40 MG/1
40 TABLET, FILM COATED ORAL
Status: DISCONTINUED | OUTPATIENT
Start: 2017-01-01 | End: 2017-01-01 | Stop reason: HOSPADM

## 2017-01-01 RX ORDER — METHYLPREDNISOLONE SODIUM SUCCINATE 125 MG/2ML
60 INJECTION, POWDER, LYOPHILIZED, FOR SOLUTION INTRAMUSCULAR; INTRAVENOUS ONCE
Status: COMPLETED | OUTPATIENT
Start: 2017-01-01 | End: 2017-01-01

## 2017-01-01 RX ORDER — OSELTAMIVIR PHOSPHATE 75 MG/1
75 CAPSULE ORAL EVERY 12 HOURS SCHEDULED
Status: DISCONTINUED | OUTPATIENT
Start: 2017-01-01 | End: 2017-01-01

## 2017-01-01 RX ORDER — ONDANSETRON 8 MG/1
8 TABLET, ORALLY DISINTEGRATING ORAL EVERY 8 HOURS PRN
Qty: 20 TABLET | Refills: 0 | Status: SHIPPED | OUTPATIENT
Start: 2017-01-01 | End: 2017-01-01 | Stop reason: HOSPADM

## 2017-01-01 RX ORDER — OXYCODONE HYDROCHLORIDE 10 MG/1
10 TABLET ORAL EVERY 4 HOURS PRN
Qty: 30 TABLET | Refills: 0 | Status: SHIPPED | OUTPATIENT
Start: 2017-01-01 | End: 2017-01-01

## 2017-01-01 RX ORDER — SENNOSIDES 8.6 MG
1 TABLET ORAL DAILY
Status: DISCONTINUED | OUTPATIENT
Start: 2017-01-01 | End: 2017-01-01 | Stop reason: HOSPADM

## 2017-01-01 RX ORDER — BENZONATATE 100 MG/1
200 CAPSULE ORAL 3 TIMES DAILY PRN
Status: DISCONTINUED | OUTPATIENT
Start: 2017-01-01 | End: 2017-01-01

## 2017-01-01 RX ORDER — SODIUM CHLORIDE 9 MG/ML
20 INJECTION, SOLUTION INTRAVENOUS CONTINUOUS
Status: DISCONTINUED | OUTPATIENT
Start: 2017-01-01 | End: 2017-01-01 | Stop reason: HOSPADM

## 2017-01-01 RX ORDER — ACETYLCYSTEINE 200 MG/ML
3 SOLUTION ORAL; RESPIRATORY (INHALATION)
Status: DISCONTINUED | OUTPATIENT
Start: 2017-01-01 | End: 2017-01-01

## 2017-01-01 RX ORDER — GLYCOPYRROLATE 0.2 MG/ML
0.2 INJECTION INTRAMUSCULAR; INTRAVENOUS EVERY 4 HOURS PRN
Status: DISCONTINUED | OUTPATIENT
Start: 2017-01-01 | End: 2017-01-01 | Stop reason: HOSPADM

## 2017-01-01 RX ORDER — GLYCOPYRROLATE 0.2 MG/ML
0.2 INJECTION INTRAMUSCULAR; INTRAVENOUS ONCE
Status: COMPLETED | OUTPATIENT
Start: 2017-01-01 | End: 2017-01-01

## 2017-01-01 RX ORDER — POTASSIUM CHLORIDE 750 MG/1
20 TABLET, FILM COATED, EXTENDED RELEASE ORAL DAILY
Status: DISCONTINUED | OUTPATIENT
Start: 2017-01-01 | End: 2017-01-01 | Stop reason: CLARIF

## 2017-01-01 RX ORDER — BUDESONIDE 0.5 MG/2ML
0.5 INHALANT ORAL
Status: DISCONTINUED | OUTPATIENT
Start: 2017-01-01 | End: 2017-01-01

## 2017-01-01 RX ORDER — ATORVASTATIN CALCIUM 40 MG/1
40 TABLET, FILM COATED ORAL EVERY EVENING
Status: DISCONTINUED | OUTPATIENT
Start: 2017-01-01 | End: 2017-01-01 | Stop reason: HOSPADM

## 2017-01-01 RX ORDER — OXYCODONE HYDROCHLORIDE 5 MG/1
5 TABLET ORAL
Qty: 180 TABLET | Refills: 0 | Status: SHIPPED | OUTPATIENT
Start: 2017-01-01 | End: 2017-01-01

## 2017-01-01 RX ORDER — PANTOPRAZOLE SODIUM 20 MG/1
20 TABLET, DELAYED RELEASE ORAL
Status: DISCONTINUED | OUTPATIENT
Start: 2017-01-01 | End: 2017-01-01 | Stop reason: HOSPADM

## 2017-01-01 RX ORDER — ONDANSETRON 8 MG/1
8 TABLET, ORALLY DISINTEGRATING ORAL EVERY 8 HOURS PRN
Status: DISCONTINUED | OUTPATIENT
Start: 2017-01-01 | End: 2017-01-01 | Stop reason: HOSPADM

## 2017-01-01 RX ORDER — FENTANYL CITRATE 50 UG/ML
INJECTION, SOLUTION INTRAMUSCULAR; INTRAVENOUS CODE/TRAUMA/SEDATION MEDICATION
Status: COMPLETED | OUTPATIENT
Start: 2017-01-01 | End: 2017-01-01

## 2017-01-01 RX ORDER — DOCUSATE SODIUM 100 MG/1
100 CAPSULE, LIQUID FILLED ORAL 2 TIMES DAILY
Status: DISCONTINUED | OUTPATIENT
Start: 2017-01-01 | End: 2017-01-01

## 2017-01-01 RX ORDER — SENNOSIDES 8.6 MG
1 TABLET ORAL DAILY
Status: DISCONTINUED | OUTPATIENT
Start: 2017-01-01 | End: 2017-01-01

## 2017-01-01 RX ORDER — FLUTICASONE PROPIONATE 50 MCG
2 SPRAY, SUSPENSION (ML) NASAL DAILY
Status: DISCONTINUED | OUTPATIENT
Start: 2017-01-01 | End: 2017-01-01 | Stop reason: HOSPADM

## 2017-01-01 RX ORDER — ACETAMINOPHEN 325 MG/1
650 TABLET ORAL ONCE
Status: COMPLETED | OUTPATIENT
Start: 2017-01-01 | End: 2017-01-01

## 2017-01-01 RX ORDER — FENTANYL CITRATE 50 UG/ML
50 INJECTION, SOLUTION INTRAMUSCULAR; INTRAVENOUS ONCE
Status: COMPLETED | OUTPATIENT
Start: 2017-01-01 | End: 2017-01-01

## 2017-01-01 RX ORDER — LORAZEPAM 2 MG/ML
1 INJECTION INTRAMUSCULAR
Status: DISCONTINUED | OUTPATIENT
Start: 2017-01-01 | End: 2017-01-01 | Stop reason: HOSPADM

## 2017-01-01 RX ORDER — OXYBUTYNIN CHLORIDE 5 MG/1
10 TABLET, EXTENDED RELEASE ORAL
Status: DISCONTINUED | OUTPATIENT
Start: 2017-01-01 | End: 2017-01-01

## 2017-01-01 RX ORDER — ALBUTEROL SULFATE 2.5 MG/3ML
5 SOLUTION RESPIRATORY (INHALATION) ONCE
Status: COMPLETED | OUTPATIENT
Start: 2017-01-01 | End: 2017-01-01

## 2017-01-01 RX ORDER — CEFDINIR 300 MG/1
300 CAPSULE ORAL EVERY 12 HOURS SCHEDULED
Status: DISCONTINUED | OUTPATIENT
Start: 2017-01-01 | End: 2017-01-01 | Stop reason: HOSPADM

## 2017-01-01 RX ORDER — LORAZEPAM 1 MG/1
1 TABLET ORAL 2 TIMES DAILY
Status: DISCONTINUED | OUTPATIENT
Start: 2017-01-01 | End: 2017-01-01 | Stop reason: HOSPADM

## 2017-01-01 RX ORDER — LORAZEPAM 0.5 MG/1
1.5 TABLET ORAL 2 TIMES DAILY PRN
Qty: 60 TABLET | Refills: 0 | Status: SHIPPED | OUTPATIENT
Start: 2017-01-01 | End: 2017-01-01 | Stop reason: HOSPADM

## 2017-01-01 RX ORDER — ASPIRIN 81 MG/1
81 TABLET, CHEWABLE ORAL DAILY
Status: DISCONTINUED | OUTPATIENT
Start: 2017-01-01 | End: 2017-01-01 | Stop reason: HOSPADM

## 2017-01-01 RX ORDER — PANTOPRAZOLE SODIUM 40 MG/1
40 TABLET, DELAYED RELEASE ORAL
Status: DISCONTINUED | OUTPATIENT
Start: 2017-01-01 | End: 2017-01-01 | Stop reason: HOSPADM

## 2017-01-01 RX ORDER — CEFDINIR 300 MG/1
300 CAPSULE ORAL EVERY 12 HOURS SCHEDULED
Qty: 4 CAPSULE | Refills: 0 | Status: SHIPPED | OUTPATIENT
Start: 2017-01-01 | End: 2017-01-01

## 2017-01-01 RX ORDER — ALBUTEROL SULFATE 2.5 MG/3ML
2.5 SOLUTION RESPIRATORY (INHALATION) EVERY 4 HOURS PRN
Status: DISCONTINUED | OUTPATIENT
Start: 2017-01-01 | End: 2017-01-01 | Stop reason: HOSPADM

## 2017-01-01 RX ORDER — POTASSIUM CHLORIDE 750 MG/1
20 TABLET, EXTENDED RELEASE ORAL DAILY
Status: DISCONTINUED | OUTPATIENT
Start: 2017-01-01 | End: 2017-01-01 | Stop reason: HOSPADM

## 2017-01-01 RX ORDER — POLYETHYLENE GLYCOL 3350 17 G/17G
17 POWDER, FOR SOLUTION ORAL DAILY PRN
Status: DISCONTINUED | OUTPATIENT
Start: 2017-01-01 | End: 2017-01-01 | Stop reason: HOSPADM

## 2017-01-01 RX ORDER — LEVALBUTEROL INHALATION SOLUTION 0.63 MG/3ML
0.63 SOLUTION RESPIRATORY (INHALATION)
Status: DISCONTINUED | OUTPATIENT
Start: 2017-01-01 | End: 2017-01-01

## 2017-01-01 RX ORDER — METHYLPREDNISOLONE SODIUM SUCCINATE 125 MG/2ML
80 INJECTION, POWDER, LYOPHILIZED, FOR SOLUTION INTRAMUSCULAR; INTRAVENOUS EVERY 8 HOURS SCHEDULED
Status: DISCONTINUED | OUTPATIENT
Start: 2017-01-01 | End: 2017-01-01

## 2017-01-01 RX ADMIN — PREDNISONE 60 MG: 20 TABLET ORAL at 22:13

## 2017-01-01 RX ADMIN — BUDESONIDE 0.5 MG: 0.5 INHALANT RESPIRATORY (INHALATION) at 07:00

## 2017-01-01 RX ADMIN — ATORVASTATIN CALCIUM 40 MG: 40 TABLET, FILM COATED ORAL at 18:28

## 2017-01-01 RX ADMIN — METHYLPREDNISOLONE SODIUM SUCCINATE 40 MG: 40 INJECTION, POWDER, FOR SOLUTION INTRAMUSCULAR; INTRAVENOUS at 12:08

## 2017-01-01 RX ADMIN — BUDESONIDE 0.5 MG: 0.5 INHALANT RESPIRATORY (INHALATION) at 07:15

## 2017-01-01 RX ADMIN — HEPARIN SODIUM 5000 UNITS: 5000 INJECTION, SOLUTION INTRAVENOUS; SUBCUTANEOUS at 05:51

## 2017-01-01 RX ADMIN — METHYLPREDNISOLONE SODIUM SUCCINATE 40 MG: 40 INJECTION, POWDER, FOR SOLUTION INTRAMUSCULAR; INTRAVENOUS at 09:09

## 2017-01-01 RX ADMIN — GABAPENTIN 300 MG: 300 CAPSULE ORAL at 20:31

## 2017-01-01 RX ADMIN — ALBUTEROL SULFATE 5 MG: 2.5 SOLUTION RESPIRATORY (INHALATION) at 18:21

## 2017-01-01 RX ADMIN — GABAPENTIN 300 MG: 300 CAPSULE ORAL at 22:05

## 2017-01-01 RX ADMIN — OXYCODONE HYDROCHLORIDE 30 MG: 10 TABLET ORAL at 04:21

## 2017-01-01 RX ADMIN — ENOXAPARIN SODIUM 40 MG: 40 INJECTION SUBCUTANEOUS at 13:01

## 2017-01-01 RX ADMIN — GABAPENTIN 300 MG: 300 CAPSULE ORAL at 16:08

## 2017-01-01 RX ADMIN — IPRATROPIUM BROMIDE 0.5 MG: 0.5 SOLUTION RESPIRATORY (INHALATION) at 07:27

## 2017-01-01 RX ADMIN — OLANZAPINE 5 MG: 5 TABLET, FILM COATED ORAL at 21:32

## 2017-01-01 RX ADMIN — BUDESONIDE 0.5 MG: 0.5 INHALANT RESPIRATORY (INHALATION) at 19:24

## 2017-01-01 RX ADMIN — PREDNISOLONE ACETATE 1 DROP: 10 SUSPENSION/ DROPS OPHTHALMIC at 01:10

## 2017-01-01 RX ADMIN — SODIUM CHLORIDE 240 MG: 9 INJECTION, SOLUTION INTRAVENOUS at 14:35

## 2017-01-01 RX ADMIN — LIDOCAINE 1 PATCH: 50 PATCH CUTANEOUS at 22:15

## 2017-01-01 RX ADMIN — TRAZODONE HYDROCHLORIDE 50 MG: 50 TABLET ORAL at 21:49

## 2017-01-01 RX ADMIN — FINASTERIDE 5 MG: 5 TABLET, FILM COATED ORAL at 09:12

## 2017-01-01 RX ADMIN — OSELTAMIVIR PHOSPHATE 75 MG: 75 CAPSULE ORAL at 09:05

## 2017-01-01 RX ADMIN — SODIUM CHLORIDE 75 ML/HR: 0.9 INJECTION, SOLUTION INTRAVENOUS at 13:06

## 2017-01-01 RX ADMIN — OLANZAPINE 5 MG: 5 TABLET, FILM COATED ORAL at 21:47

## 2017-01-01 RX ADMIN — TAMSULOSIN HYDROCHLORIDE 0.4 MG: 0.4 CAPSULE ORAL at 15:38

## 2017-01-01 RX ADMIN — FUROSEMIDE 20 MG: 10 INJECTION, SOLUTION INTRAMUSCULAR; INTRAVENOUS at 09:15

## 2017-01-01 RX ADMIN — LORAZEPAM 1.5 MG: 0.5 TABLET ORAL at 14:47

## 2017-01-01 RX ADMIN — OXYCODONE HYDROCHLORIDE 15 MG: 5 TABLET ORAL at 20:56

## 2017-01-01 RX ADMIN — METHYLPREDNISOLONE SODIUM SUCCINATE 40 MG: 40 INJECTION, POWDER, FOR SOLUTION INTRAMUSCULAR; INTRAVENOUS at 21:40

## 2017-01-01 RX ADMIN — OXYBUTYNIN CHLORIDE 10 MG: 5 TABLET, EXTENDED RELEASE ORAL at 21:44

## 2017-01-01 RX ADMIN — DEXAMETHASONE 8 MG: 4 TABLET ORAL at 22:11

## 2017-01-01 RX ADMIN — LORAZEPAM 1 MG: 1 TABLET ORAL at 05:40

## 2017-01-01 RX ADMIN — GABAPENTIN 300 MG: 300 CAPSULE ORAL at 10:02

## 2017-01-01 RX ADMIN — GLYCOPYRROLATE 0.2 MG: 0.2 INJECTION, SOLUTION INTRAMUSCULAR; INTRAVENOUS at 20:05

## 2017-01-01 RX ADMIN — LEVALBUTEROL HYDROCHLORIDE 1.25 MG: 1.25 SOLUTION, CONCENTRATE RESPIRATORY (INHALATION) at 14:48

## 2017-01-01 RX ADMIN — DOCUSATE SODIUM 100 MG: 100 CAPSULE, LIQUID FILLED ORAL at 18:10

## 2017-01-01 RX ADMIN — ENOXAPARIN SODIUM 40 MG: 40 INJECTION SUBCUTANEOUS at 08:23

## 2017-01-01 RX ADMIN — TRAZODONE HYDROCHLORIDE 50 MG: 50 TABLET ORAL at 00:05

## 2017-01-01 RX ADMIN — OXYCODONE HYDROCHLORIDE 10 MG: 10 TABLET ORAL at 23:55

## 2017-01-01 RX ADMIN — VALSARTAN: 160 TABLET ORAL at 12:08

## 2017-01-01 RX ADMIN — GUAIFENESIN 1200 MG: 600 TABLET, EXTENDED RELEASE ORAL at 20:27

## 2017-01-01 RX ADMIN — ENOXAPARIN SODIUM 40 MG: 40 INJECTION SUBCUTANEOUS at 10:01

## 2017-01-01 RX ADMIN — ACETAMINOPHEN 975 MG: 325 TABLET, FILM COATED ORAL at 13:25

## 2017-01-01 RX ADMIN — ATORVASTATIN CALCIUM 40 MG: 40 TABLET, FILM COATED ORAL at 18:10

## 2017-01-01 RX ADMIN — ACETAMINOPHEN 975 MG: 325 TABLET, FILM COATED ORAL at 05:22

## 2017-01-01 RX ADMIN — LEVALBUTEROL HYDROCHLORIDE 0.63 MG: 0.63 SOLUTION RESPIRATORY (INHALATION) at 19:09

## 2017-01-01 RX ADMIN — SENNOSIDES 8.6 MG: 8.6 TABLET, FILM COATED ORAL at 08:38

## 2017-01-01 RX ADMIN — OXYCODONE HYDROCHLORIDE 15 MG: 5 TABLET ORAL at 22:12

## 2017-01-01 RX ADMIN — IPRATROPIUM BROMIDE 0.5 MG: 0.5 SOLUTION RESPIRATORY (INHALATION) at 17:06

## 2017-01-01 RX ADMIN — ENOXAPARIN SODIUM 40 MG: 40 INJECTION SUBCUTANEOUS at 08:57

## 2017-01-01 RX ADMIN — OXYBUTYNIN CHLORIDE 10 MG: 5 TABLET, EXTENDED RELEASE ORAL at 00:05

## 2017-01-01 RX ADMIN — METHYLPREDNISOLONE SODIUM SUCCINATE 40 MG: 40 INJECTION, POWDER, FOR SOLUTION INTRAMUSCULAR; INTRAVENOUS at 05:40

## 2017-01-01 RX ADMIN — ACETAMINOPHEN 975 MG: 325 TABLET, FILM COATED ORAL at 13:18

## 2017-01-01 RX ADMIN — FLUTICASONE PROPIONATE 1 SPRAY: 50 SPRAY, METERED NASAL at 10:02

## 2017-01-01 RX ADMIN — GABAPENTIN 300 MG: 300 CAPSULE ORAL at 15:36

## 2017-01-01 RX ADMIN — LEVALBUTEROL 0.63 MG: 0.63 SOLUTION RESPIRATORY (INHALATION) at 13:13

## 2017-01-01 RX ADMIN — ACETYLCYSTEINE 3 ML: 200 SOLUTION ORAL; RESPIRATORY (INHALATION) at 13:13

## 2017-01-01 RX ADMIN — IPRATROPIUM BROMIDE 0.5 MG: 0.5 SOLUTION RESPIRATORY (INHALATION) at 19:31

## 2017-01-01 RX ADMIN — OLANZAPINE 5 MG: 5 TABLET, FILM COATED ORAL at 00:04

## 2017-01-01 RX ADMIN — TAMSULOSIN HYDROCHLORIDE 0.4 MG: 0.4 CAPSULE ORAL at 17:00

## 2017-01-01 RX ADMIN — FLUTICASONE PROPIONATE 1 SPRAY: 50 SPRAY, METERED NASAL at 09:23

## 2017-01-01 RX ADMIN — HEPARIN SODIUM 5000 UNITS: 5000 INJECTION, SOLUTION INTRAVENOUS; SUBCUTANEOUS at 06:12

## 2017-01-01 RX ADMIN — LEVALBUTEROL HYDROCHLORIDE 1.25 MG: 1.25 SOLUTION, CONCENTRATE RESPIRATORY (INHALATION) at 19:24

## 2017-01-01 RX ADMIN — ACETYLCYSTEINE 600 MG: 200 SOLUTION ORAL; RESPIRATORY (INHALATION) at 00:42

## 2017-01-01 RX ADMIN — FUROSEMIDE 20 MG: 10 INJECTION, SOLUTION INTRAMUSCULAR; INTRAVENOUS at 09:35

## 2017-01-01 RX ADMIN — ACETAMINOPHEN 975 MG: 325 TABLET, FILM COATED ORAL at 13:30

## 2017-01-01 RX ADMIN — POTASSIUM CHLORIDE 20 MEQ: 1500 TABLET, EXTENDED RELEASE ORAL at 12:08

## 2017-01-01 RX ADMIN — GABAPENTIN 300 MG: 300 CAPSULE ORAL at 10:58

## 2017-01-01 RX ADMIN — GABAPENTIN 300 MG: 300 CAPSULE ORAL at 00:05

## 2017-01-01 RX ADMIN — GABAPENTIN 300 MG: 300 CAPSULE ORAL at 10:16

## 2017-01-01 RX ADMIN — OXYCODONE HYDROCHLORIDE 15 MG: 5 TABLET ORAL at 21:07

## 2017-01-01 RX ADMIN — FLUTICASONE PROPIONATE AND SALMETEROL 1 PUFF: 50; 500 POWDER RESPIRATORY (INHALATION) at 10:59

## 2017-01-01 RX ADMIN — IPRATROPIUM BROMIDE 0.5 MG: 0.5 SOLUTION RESPIRATORY (INHALATION) at 08:29

## 2017-01-01 RX ADMIN — ISODIUM CHLORIDE 3 ML: 0.03 SOLUTION RESPIRATORY (INHALATION) at 19:25

## 2017-01-01 RX ADMIN — HYDROMORPHONE HYDROCHLORIDE 0.5 MG: 1 INJECTION, SOLUTION INTRAMUSCULAR; INTRAVENOUS; SUBCUTANEOUS at 05:31

## 2017-01-01 RX ADMIN — OXYBUTYNIN CHLORIDE 10 MG: 5 TABLET, EXTENDED RELEASE ORAL at 22:11

## 2017-01-01 RX ADMIN — GABAPENTIN 300 MG: 300 CAPSULE ORAL at 21:29

## 2017-01-01 RX ADMIN — GABAPENTIN 300 MG: 300 CAPSULE ORAL at 16:13

## 2017-01-01 RX ADMIN — DOCUSATE SODIUM 100 MG: 100 CAPSULE, LIQUID FILLED ORAL at 09:28

## 2017-01-01 RX ADMIN — ACETYLCYSTEINE 600 MG: 200 SOLUTION ORAL; RESPIRATORY (INHALATION) at 19:58

## 2017-01-01 RX ADMIN — GUAIFENESIN 1200 MG: 600 TABLET, EXTENDED RELEASE ORAL at 11:33

## 2017-01-01 RX ADMIN — IPRATROPIUM BROMIDE 0.5 MG: 0.5 SOLUTION RESPIRATORY (INHALATION) at 19:24

## 2017-01-01 RX ADMIN — ISODIUM CHLORIDE 3 ML: 0.03 SOLUTION RESPIRATORY (INHALATION) at 08:19

## 2017-01-01 RX ADMIN — FLUTICASONE PROPIONATE AND SALMETEROL 1 PUFF: 50; 500 POWDER RESPIRATORY (INHALATION) at 09:42

## 2017-01-01 RX ADMIN — ASPIRIN 81 MG: 81 TABLET, CHEWABLE ORAL at 09:12

## 2017-01-01 RX ADMIN — LEVALBUTEROL HYDROCHLORIDE 0.63 MG: 0.63 SOLUTION RESPIRATORY (INHALATION) at 19:35

## 2017-01-01 RX ADMIN — METHYLPREDNISOLONE SODIUM SUCCINATE 40 MG: 40 INJECTION, POWDER, FOR SOLUTION INTRAMUSCULAR; INTRAVENOUS at 20:31

## 2017-01-01 RX ADMIN — OXYCODONE HYDROCHLORIDE 10 MG: 10 TABLET ORAL at 17:45

## 2017-01-01 RX ADMIN — FUROSEMIDE 20 MG: 10 INJECTION, SOLUTION INTRAMUSCULAR; INTRAVENOUS at 15:15

## 2017-01-01 RX ADMIN — IPRATROPIUM BROMIDE 0.5 MG: 0.5 SOLUTION RESPIRATORY (INHALATION) at 14:48

## 2017-01-01 RX ADMIN — LORAZEPAM 1 MG: 1 TABLET ORAL at 08:29

## 2017-01-01 RX ADMIN — Medication 300 UNITS: at 13:09

## 2017-01-01 RX ADMIN — LEVALBUTEROL HYDROCHLORIDE 0.63 MG: 0.63 SOLUTION RESPIRATORY (INHALATION) at 20:07

## 2017-01-01 RX ADMIN — POTASSIUM CHLORIDE 20 MEQ: 1500 TABLET, EXTENDED RELEASE ORAL at 09:43

## 2017-01-01 RX ADMIN — IPRATROPIUM BROMIDE 0.5 MG: 0.5 SOLUTION RESPIRATORY (INHALATION) at 07:40

## 2017-01-01 RX ADMIN — TRAZODONE HYDROCHLORIDE 50 MG: 50 TABLET ORAL at 21:47

## 2017-01-01 RX ADMIN — DOCUSATE SODIUM 100 MG: 100 CAPSULE, LIQUID FILLED ORAL at 08:23

## 2017-01-01 RX ADMIN — AZITHROMYCIN MONOHYDRATE 500 MG: 500 INJECTION, POWDER, LYOPHILIZED, FOR SOLUTION INTRAVENOUS at 20:34

## 2017-01-01 RX ADMIN — OXYCODONE HYDROCHLORIDE 10 MG: 10 TABLET ORAL at 22:35

## 2017-01-01 RX ADMIN — IPRATROPIUM BROMIDE 0.5 MG: 0.5 SOLUTION RESPIRATORY (INHALATION) at 07:28

## 2017-01-01 RX ADMIN — HYDROMORPHONE HYDROCHLORIDE 0.3 MG: 1 INJECTION, SOLUTION INTRAMUSCULAR; INTRAVENOUS; SUBCUTANEOUS at 12:24

## 2017-01-01 RX ADMIN — GABAPENTIN 300 MG: 300 CAPSULE ORAL at 20:27

## 2017-01-01 RX ADMIN — GABAPENTIN 300 MG: 300 CAPSULE ORAL at 09:27

## 2017-01-01 RX ADMIN — BUDESONIDE 0.5 MG: 0.5 INHALANT RESPIRATORY (INHALATION) at 08:35

## 2017-01-01 RX ADMIN — ACETAMINOPHEN 975 MG: 325 TABLET, FILM COATED ORAL at 21:29

## 2017-01-01 RX ADMIN — POTASSIUM CHLORIDE 20 MEQ: 1500 TABLET, EXTENDED RELEASE ORAL at 09:12

## 2017-01-01 RX ADMIN — SODIUM CHLORIDE 75 ML/HR: 0.9 INJECTION, SOLUTION INTRAVENOUS at 12:13

## 2017-01-01 RX ADMIN — METHYLPREDNISOLONE SODIUM SUCCINATE 40 MG: 40 INJECTION, POWDER, FOR SOLUTION INTRAMUSCULAR; INTRAVENOUS at 22:05

## 2017-01-01 RX ADMIN — ENOXAPARIN SODIUM 40 MG: 40 INJECTION SUBCUTANEOUS at 13:17

## 2017-01-01 RX ADMIN — GABAPENTIN 300 MG: 300 CAPSULE ORAL at 08:46

## 2017-01-01 RX ADMIN — METHYLPREDNISOLONE SODIUM SUCCINATE 40 MG: 40 INJECTION, POWDER, FOR SOLUTION INTRAMUSCULAR; INTRAVENOUS at 13:18

## 2017-01-01 RX ADMIN — PREDNISONE 60 MG: 20 TABLET ORAL at 09:07

## 2017-01-01 RX ADMIN — OXYCODONE HYDROCHLORIDE 15 MG: 5 TABLET ORAL at 05:01

## 2017-01-01 RX ADMIN — POTASSIUM CHLORIDE 20 MEQ: 1500 TABLET, EXTENDED RELEASE ORAL at 11:33

## 2017-01-01 RX ADMIN — LEVALBUTEROL HYDROCHLORIDE 1.25 MG: 1.25 SOLUTION, CONCENTRATE RESPIRATORY (INHALATION) at 20:14

## 2017-01-01 RX ADMIN — ATORVASTATIN CALCIUM 40 MG: 40 TABLET, FILM COATED ORAL at 00:04

## 2017-01-01 RX ADMIN — TAMSULOSIN HYDROCHLORIDE 0.4 MG: 0.4 CAPSULE ORAL at 17:52

## 2017-01-01 RX ADMIN — LEVALBUTEROL HYDROCHLORIDE 1.25 MG: 1.25 SOLUTION, CONCENTRATE RESPIRATORY (INHALATION) at 19:31

## 2017-01-01 RX ADMIN — IPRATROPIUM BROMIDE 0.5 MG: 0.5 SOLUTION RESPIRATORY (INHALATION) at 07:35

## 2017-01-01 RX ADMIN — MORPHINE SULFATE 2 MG: 2 INJECTION, SOLUTION INTRAMUSCULAR; INTRAVENOUS at 20:46

## 2017-01-01 RX ADMIN — FENTANYL CITRATE 25 MCG: 50 INJECTION, SOLUTION INTRAMUSCULAR; INTRAVENOUS at 10:50

## 2017-01-01 RX ADMIN — ACETAMINOPHEN 975 MG: 325 TABLET, FILM COATED ORAL at 21:07

## 2017-01-01 RX ADMIN — LORAZEPAM 1 MG: 1 TABLET ORAL at 14:02

## 2017-01-01 RX ADMIN — FLUTICASONE PROPIONATE 1 SPRAY: 50 SPRAY, METERED NASAL at 08:30

## 2017-01-01 RX ADMIN — GABAPENTIN 300 MG: 300 CAPSULE ORAL at 09:14

## 2017-01-01 RX ADMIN — GABAPENTIN 300 MG: 300 CAPSULE ORAL at 08:38

## 2017-01-01 RX ADMIN — TRAZODONE HYDROCHLORIDE 50 MG: 50 TABLET ORAL at 23:44

## 2017-01-01 RX ADMIN — LEVALBUTEROL HYDROCHLORIDE 0.63 MG: 0.63 SOLUTION RESPIRATORY (INHALATION) at 07:13

## 2017-01-01 RX ADMIN — PANTOPRAZOLE SODIUM 20 MG: 20 TABLET, DELAYED RELEASE ORAL at 05:31

## 2017-01-01 RX ADMIN — GABAPENTIN 300 MG: 300 CAPSULE ORAL at 08:29

## 2017-01-01 RX ADMIN — GABAPENTIN 300 MG: 300 CAPSULE ORAL at 20:47

## 2017-01-01 RX ADMIN — OSELTAMIVIR PHOSPHATE 75 MG: 75 CAPSULE ORAL at 09:27

## 2017-01-01 RX ADMIN — TAMSULOSIN HYDROCHLORIDE 0.4 MG: 0.4 CAPSULE ORAL at 15:36

## 2017-01-01 RX ADMIN — HYDROMORPHONE HYDROCHLORIDE 0.5 MG: 1 INJECTION, SOLUTION INTRAMUSCULAR; INTRAVENOUS; SUBCUTANEOUS at 12:56

## 2017-01-01 RX ADMIN — METHYLPREDNISOLONE SODIUM SUCCINATE 40 MG: 40 INJECTION, POWDER, FOR SOLUTION INTRAMUSCULAR; INTRAVENOUS at 14:02

## 2017-01-01 RX ADMIN — OXYBUTYNIN CHLORIDE 10 MG: 5 TABLET, EXTENDED RELEASE ORAL at 22:09

## 2017-01-01 RX ADMIN — LEVALBUTEROL HYDROCHLORIDE 1.25 MG: 1.25 SOLUTION, CONCENTRATE RESPIRATORY (INHALATION) at 07:30

## 2017-01-01 RX ADMIN — PREDNISOLONE ACETATE 1 DROP: 10 SUSPENSION/ DROPS OPHTHALMIC at 09:19

## 2017-01-01 RX ADMIN — PREDNISONE 40 MG: 20 TABLET ORAL at 08:36

## 2017-01-01 RX ADMIN — IPRATROPIUM BROMIDE 0.5 MG: 0.5 SOLUTION RESPIRATORY (INHALATION) at 19:36

## 2017-01-01 RX ADMIN — TRAZODONE HYDROCHLORIDE 50 MG: 50 TABLET ORAL at 22:35

## 2017-01-01 RX ADMIN — GABAPENTIN 300 MG: 300 CAPSULE ORAL at 17:10

## 2017-01-01 RX ADMIN — GABAPENTIN 300 MG: 300 CAPSULE ORAL at 21:42

## 2017-01-01 RX ADMIN — HYDROMORPHONE HYDROCHLORIDE 0.5 MG: 1 INJECTION, SOLUTION INTRAMUSCULAR; INTRAVENOUS; SUBCUTANEOUS at 10:26

## 2017-01-01 RX ADMIN — HYDROMORPHONE HYDROCHLORIDE 0.5 MG: 1 INJECTION, SOLUTION INTRAMUSCULAR; INTRAVENOUS; SUBCUTANEOUS at 21:54

## 2017-01-01 RX ADMIN — TRAZODONE HYDROCHLORIDE 50 MG: 50 TABLET ORAL at 21:32

## 2017-01-01 RX ADMIN — MORPHINE SULFATE 2 MG: 2 INJECTION, SOLUTION INTRAMUSCULAR; INTRAVENOUS at 23:39

## 2017-01-01 RX ADMIN — TRAZODONE HYDROCHLORIDE 50 MG: 50 TABLET ORAL at 21:22

## 2017-01-01 RX ADMIN — OXYCODONE HYDROCHLORIDE 30 MG: 10 TABLET ORAL at 08:23

## 2017-01-01 RX ADMIN — POTASSIUM CHLORIDE 20 MEQ: 1500 TABLET, EXTENDED RELEASE ORAL at 08:46

## 2017-01-01 RX ADMIN — HYDROMORPHONE HYDROCHLORIDE 0.5 MG: 1 INJECTION, SOLUTION INTRAMUSCULAR; INTRAVENOUS; SUBCUTANEOUS at 18:07

## 2017-01-01 RX ADMIN — GUAIFENESIN 1200 MG: 600 TABLET, EXTENDED RELEASE ORAL at 09:27

## 2017-01-01 RX ADMIN — METHADONE HYDROCHLORIDE 5 MG: 5 SOLUTION ORAL at 09:15

## 2017-01-01 RX ADMIN — GUAIFENESIN 1200 MG: 600 TABLET, EXTENDED RELEASE ORAL at 20:26

## 2017-01-01 RX ADMIN — GABAPENTIN 300 MG: 300 CAPSULE ORAL at 15:57

## 2017-01-01 RX ADMIN — FINASTERIDE 5 MG: 5 TABLET, FILM COATED ORAL at 08:35

## 2017-01-01 RX ADMIN — GUAIFENESIN 1200 MG: 600 TABLET, EXTENDED RELEASE ORAL at 08:46

## 2017-01-01 RX ADMIN — FLUTICASONE PROPIONATE AND SALMETEROL 1 PUFF: 50; 500 POWDER RESPIRATORY (INHALATION) at 09:27

## 2017-01-01 RX ADMIN — IPRATROPIUM BROMIDE 0.5 MG: 0.5 SOLUTION RESPIRATORY (INHALATION) at 20:07

## 2017-01-01 RX ADMIN — LORAZEPAM 1.5 MG: 0.5 TABLET ORAL at 18:16

## 2017-01-01 RX ADMIN — ACETYLCYSTEINE 600 MG: 200 SOLUTION ORAL; RESPIRATORY (INHALATION) at 13:23

## 2017-01-01 RX ADMIN — GABAPENTIN 300 MG: 300 CAPSULE ORAL at 22:11

## 2017-01-01 RX ADMIN — GABAPENTIN 300 MG: 300 CAPSULE ORAL at 16:11

## 2017-01-01 RX ADMIN — FLUTICASONE PROPIONATE AND SALMETEROL 1 PUFF: 50; 500 POWDER RESPIRATORY (INHALATION) at 10:17

## 2017-01-01 RX ADMIN — FLUTICASONE PROPIONATE 1 SPRAY: 50 SPRAY, METERED NASAL at 10:17

## 2017-01-01 RX ADMIN — BUDESONIDE 0.5 MG: 0.5 INHALANT RESPIRATORY (INHALATION) at 07:28

## 2017-01-01 RX ADMIN — OXYCODONE HYDROCHLORIDE 10 MG: 10 TABLET ORAL at 17:25

## 2017-01-01 RX ADMIN — GABAPENTIN 300 MG: 300 CAPSULE ORAL at 07:50

## 2017-01-01 RX ADMIN — SODIUM CHLORIDE 75 ML/HR: 0.9 INJECTION, SOLUTION INTRAVENOUS at 23:55

## 2017-01-01 RX ADMIN — GLYCOPYRROLATE 0.2 MG: 0.2 INJECTION, SOLUTION INTRAMUSCULAR; INTRAVENOUS at 22:27

## 2017-01-01 RX ADMIN — PREDNISOLONE ACETATE 1 DROP: 10 SUSPENSION/ DROPS OPHTHALMIC at 08:39

## 2017-01-01 RX ADMIN — HYDROMORPHONE HYDROCHLORIDE 0.5 MG: 1 INJECTION, SOLUTION INTRAMUSCULAR; INTRAVENOUS; SUBCUTANEOUS at 15:32

## 2017-01-01 RX ADMIN — DEXAMETHASONE 8 MG: 4 TABLET ORAL at 13:31

## 2017-01-01 RX ADMIN — HYDROMORPHONE HYDROCHLORIDE 0.5 MG: 1 INJECTION, SOLUTION INTRAMUSCULAR; INTRAVENOUS; SUBCUTANEOUS at 15:14

## 2017-01-01 RX ADMIN — MORPHINE SULFATE 2 MG: 2 INJECTION, SOLUTION INTRAMUSCULAR; INTRAVENOUS at 04:08

## 2017-01-01 RX ADMIN — DOCUSATE SODIUM 100 MG: 100 CAPSULE, LIQUID FILLED ORAL at 18:28

## 2017-01-01 RX ADMIN — OLANZAPINE 2.5 MG: 5 TABLET, FILM COATED ORAL at 21:07

## 2017-01-01 RX ADMIN — ATORVASTATIN CALCIUM 40 MG: 40 TABLET, FILM COATED ORAL at 17:47

## 2017-01-01 RX ADMIN — SODIUM CHLORIDE 500 ML: 0.9 INJECTION, SOLUTION INTRAVENOUS at 19:29

## 2017-01-01 RX ADMIN — IPRATROPIUM BROMIDE 0.5 MG: 0.5 SOLUTION RESPIRATORY (INHALATION) at 13:22

## 2017-01-01 RX ADMIN — ACETAMINOPHEN 975 MG: 325 TABLET, FILM COATED ORAL at 05:40

## 2017-01-01 RX ADMIN — METHYLPREDNISOLONE SODIUM SUCCINATE 40 MG: 40 INJECTION, POWDER, FOR SOLUTION INTRAMUSCULAR; INTRAVENOUS at 09:42

## 2017-01-01 RX ADMIN — FLUTICASONE PROPIONATE AND SALMETEROL 1 PUFF: 50; 500 POWDER RESPIRATORY (INHALATION) at 09:05

## 2017-01-01 RX ADMIN — FUROSEMIDE 20 MG: 10 INJECTION, SOLUTION INTRAMUSCULAR; INTRAVENOUS at 23:26

## 2017-01-01 RX ADMIN — TRAZODONE HYDROCHLORIDE 50 MG: 50 TABLET ORAL at 22:12

## 2017-01-01 RX ADMIN — IODIXANOL 70 ML: 320 INJECTION, SOLUTION INTRAVASCULAR at 19:57

## 2017-01-01 RX ADMIN — Medication: at 12:20

## 2017-01-01 RX ADMIN — DOCUSATE SODIUM 100 MG: 100 CAPSULE, LIQUID FILLED ORAL at 07:50

## 2017-01-01 RX ADMIN — HYDROMORPHONE HYDROCHLORIDE 0.5 MG: 1 INJECTION, SOLUTION INTRAMUSCULAR; INTRAVENOUS; SUBCUTANEOUS at 09:34

## 2017-01-01 RX ADMIN — LEVALBUTEROL 0.63 MG: 0.63 SOLUTION RESPIRATORY (INHALATION) at 00:56

## 2017-01-01 RX ADMIN — BENZONATATE 200 MG: 100 CAPSULE ORAL at 21:29

## 2017-01-01 RX ADMIN — ACETAMINOPHEN 650 MG: 325 TABLET, FILM COATED ORAL at 19:31

## 2017-01-01 RX ADMIN — IPRATROPIUM BROMIDE 0.5 MG: 0.5 SOLUTION RESPIRATORY (INHALATION) at 02:14

## 2017-01-01 RX ADMIN — SENNOSIDES 8.6 MG: 8.6 TABLET, FILM COATED ORAL at 09:27

## 2017-01-01 RX ADMIN — GABAPENTIN 300 MG: 300 CAPSULE ORAL at 23:44

## 2017-01-01 RX ADMIN — LEVALBUTEROL 0.63 MG: 0.63 SOLUTION RESPIRATORY (INHALATION) at 20:19

## 2017-01-01 RX ADMIN — GABAPENTIN 300 MG: 300 CAPSULE ORAL at 17:00

## 2017-01-01 RX ADMIN — TRAZODONE HYDROCHLORIDE 50 MG: 50 TABLET ORAL at 21:08

## 2017-01-01 RX ADMIN — DOCUSATE SODIUM 100 MG: 100 CAPSULE, LIQUID FILLED ORAL at 08:34

## 2017-01-01 RX ADMIN — GABAPENTIN 300 MG: 300 CAPSULE ORAL at 09:44

## 2017-01-01 RX ADMIN — FLUTICASONE PROPIONATE AND SALMETEROL 1 PUFF: 50; 500 POWDER RESPIRATORY (INHALATION) at 12:08

## 2017-01-01 RX ADMIN — LIDOCAINE 1 PATCH: 50 PATCH CUTANEOUS at 21:30

## 2017-01-01 RX ADMIN — LEVALBUTEROL 0.63 MG: 0.63 SOLUTION RESPIRATORY (INHALATION) at 19:20

## 2017-01-01 RX ADMIN — IPRATROPIUM BROMIDE 0.5 MG: 0.5 SOLUTION RESPIRATORY (INHALATION) at 13:26

## 2017-01-01 RX ADMIN — PANTOPRAZOLE SODIUM 40 MG: 40 TABLET, DELAYED RELEASE ORAL at 06:12

## 2017-01-01 RX ADMIN — SENNOSIDES 8.6 MG: 8.6 TABLET, FILM COATED ORAL at 08:56

## 2017-01-01 RX ADMIN — OXYCODONE HYDROCHLORIDE 30 MG: 10 TABLET ORAL at 16:11

## 2017-01-01 RX ADMIN — LEVALBUTEROL 0.63 MG: 0.63 SOLUTION RESPIRATORY (INHALATION) at 07:15

## 2017-01-01 RX ADMIN — METHYLPREDNISOLONE SODIUM SUCCINATE 40 MG: 40 INJECTION, POWDER, FOR SOLUTION INTRAMUSCULAR; INTRAVENOUS at 12:14

## 2017-01-01 RX ADMIN — LEVALBUTEROL HYDROCHLORIDE 1.25 MG: 1.25 SOLUTION, CONCENTRATE RESPIRATORY (INHALATION) at 13:20

## 2017-01-01 RX ADMIN — DOCUSATE SODIUM 100 MG: 100 CAPSULE, LIQUID FILLED ORAL at 17:13

## 2017-01-01 RX ADMIN — ENOXAPARIN SODIUM 40 MG: 40 INJECTION SUBCUTANEOUS at 09:14

## 2017-01-01 RX ADMIN — SODIUM CHLORIDE 20 ML/HR: 0.9 INJECTION, SOLUTION INTRAVENOUS at 11:31

## 2017-01-01 RX ADMIN — LEVALBUTEROL HYDROCHLORIDE 0.63 MG: 0.63 SOLUTION RESPIRATORY (INHALATION) at 13:13

## 2017-01-01 RX ADMIN — LEVALBUTEROL HYDROCHLORIDE 1.25 MG: 1.25 SOLUTION, CONCENTRATE RESPIRATORY (INHALATION) at 19:50

## 2017-01-01 RX ADMIN — OSELTAMIVIR PHOSPHATE 75 MG: 75 CAPSULE ORAL at 20:31

## 2017-01-01 RX ADMIN — LORAZEPAM 1 MG: 1 TABLET ORAL at 10:06

## 2017-01-01 RX ADMIN — OXYCODONE HYDROCHLORIDE 10 MG: 10 TABLET ORAL at 13:45

## 2017-01-01 RX ADMIN — BUDESONIDE 0.5 MG: 0.5 INHALANT RESPIRATORY (INHALATION) at 07:13

## 2017-01-01 RX ADMIN — PREDNISONE 40 MG: 20 TABLET ORAL at 08:46

## 2017-01-01 RX ADMIN — DOCUSATE SODIUM 100 MG: 100 CAPSULE, LIQUID FILLED ORAL at 19:15

## 2017-01-01 RX ADMIN — IPRATROPIUM BROMIDE 0.5 MG: 0.5 SOLUTION RESPIRATORY (INHALATION) at 13:20

## 2017-01-01 RX ADMIN — BUDESONIDE 0.5 MG: 0.5 INHALANT RESPIRATORY (INHALATION) at 19:50

## 2017-01-01 RX ADMIN — DOCUSATE SODIUM 100 MG: 100 CAPSULE, LIQUID FILLED ORAL at 10:02

## 2017-01-01 RX ADMIN — SODIUM CHLORIDE 1000 ML: 0.9 INJECTION, SOLUTION INTRAVENOUS at 11:21

## 2017-01-01 RX ADMIN — LEVALBUTEROL 0.63 MG: 0.63 SOLUTION RESPIRATORY (INHALATION) at 00:28

## 2017-01-01 RX ADMIN — FLUTICASONE PROPIONATE 1 SPRAY: 50 SPRAY, METERED NASAL at 23:10

## 2017-01-01 RX ADMIN — FINASTERIDE 5 MG: 5 TABLET, FILM COATED ORAL at 10:58

## 2017-01-01 RX ADMIN — DOCUSATE SODIUM 100 MG: 100 CAPSULE, LIQUID FILLED ORAL at 23:11

## 2017-01-01 RX ADMIN — LORAZEPAM 1 MG: 1 TABLET ORAL at 22:17

## 2017-01-01 RX ADMIN — LEVALBUTEROL 0.63 MG: 0.63 SOLUTION RESPIRATORY (INHALATION) at 07:22

## 2017-01-01 RX ADMIN — GUAIFENESIN 1200 MG: 600 TABLET, EXTENDED RELEASE ORAL at 20:31

## 2017-01-01 RX ADMIN — FUROSEMIDE 20 MG: 10 INJECTION, SOLUTION INTRAMUSCULAR; INTRAVENOUS at 19:44

## 2017-01-01 RX ADMIN — TRAZODONE HYDROCHLORIDE 50 MG: 50 TABLET ORAL at 23:11

## 2017-01-01 RX ADMIN — DOCUSATE SODIUM 100 MG: 100 CAPSULE, LIQUID FILLED ORAL at 17:43

## 2017-01-01 RX ADMIN — OSELTAMIVIR PHOSPHATE 75 MG: 75 CAPSULE ORAL at 21:47

## 2017-01-01 RX ADMIN — DOCUSATE SODIUM 100 MG: 100 CAPSULE, LIQUID FILLED ORAL at 17:30

## 2017-01-01 RX ADMIN — OXYCODONE HYDROCHLORIDE 5 MG: 5 TABLET ORAL at 09:07

## 2017-01-01 RX ADMIN — LORAZEPAM 1.5 MG: 0.5 TABLET ORAL at 23:06

## 2017-01-01 RX ADMIN — GABAPENTIN 300 MG: 300 CAPSULE ORAL at 09:09

## 2017-01-01 RX ADMIN — POTASSIUM CHLORIDE 20 MEQ: 750 TABLET, EXTENDED RELEASE ORAL at 09:14

## 2017-01-01 RX ADMIN — ISODIUM CHLORIDE 3 ML: 0.03 SOLUTION RESPIRATORY (INHALATION) at 07:56

## 2017-01-01 RX ADMIN — OLANZAPINE 5 MG: 5 TABLET, FILM COATED ORAL at 22:04

## 2017-01-01 RX ADMIN — ACETAMINOPHEN 975 MG: 325 TABLET, FILM COATED ORAL at 13:49

## 2017-01-01 RX ADMIN — GABAPENTIN 300 MG: 300 CAPSULE ORAL at 23:11

## 2017-01-01 RX ADMIN — GABAPENTIN 300 MG: 300 CAPSULE ORAL at 16:42

## 2017-01-01 RX ADMIN — HYDROCODONE POLISTIREX AND CHLORPHENIRAMINE POLISTIREX 5 ML: 10; 8 SUSPENSION, EXTENDED RELEASE ORAL at 21:49

## 2017-01-01 RX ADMIN — HYDROMORPHONE HYDROCHLORIDE 0.5 MG: 1 INJECTION, SOLUTION INTRAMUSCULAR; INTRAVENOUS; SUBCUTANEOUS at 11:16

## 2017-01-01 RX ADMIN — GABAPENTIN 300 MG: 300 CAPSULE ORAL at 15:38

## 2017-01-01 RX ADMIN — NITROGLYCERIN 0.4 MG: 0.4 TABLET SUBLINGUAL at 08:33

## 2017-01-01 RX ADMIN — DOCUSATE SODIUM 100 MG: 100 CAPSULE, LIQUID FILLED ORAL at 22:14

## 2017-01-01 RX ADMIN — METHYLPREDNISOLONE SODIUM SUCCINATE 40 MG: 40 INJECTION, POWDER, FOR SOLUTION INTRAMUSCULAR; INTRAVENOUS at 05:54

## 2017-01-01 RX ADMIN — SODIUM CHLORIDE 240 MG: 9 INJECTION, SOLUTION INTRAVENOUS at 12:09

## 2017-01-01 RX ADMIN — METHYLPREDNISOLONE SODIUM SUCCINATE 40 MG: 40 INJECTION, POWDER, FOR SOLUTION INTRAMUSCULAR; INTRAVENOUS at 22:09

## 2017-01-01 RX ADMIN — FLUTICASONE PROPIONATE 1 SPRAY: 50 SPRAY, METERED NASAL at 08:45

## 2017-01-01 RX ADMIN — ACETAMINOPHEN 975 MG: 325 TABLET, FILM COATED ORAL at 05:53

## 2017-01-01 RX ADMIN — TAMSULOSIN HYDROCHLORIDE 0.4 MG: 0.4 CAPSULE ORAL at 17:30

## 2017-01-01 RX ADMIN — POTASSIUM CHLORIDE 20 MEQ: 1500 TABLET, EXTENDED RELEASE ORAL at 09:27

## 2017-01-01 RX ADMIN — HYDROMORPHONE HYDROCHLORIDE 0.5 MG: 1 INJECTION, SOLUTION INTRAMUSCULAR; INTRAVENOUS; SUBCUTANEOUS at 23:08

## 2017-01-01 RX ADMIN — DOCUSATE SODIUM 100 MG: 100 CAPSULE, LIQUID FILLED ORAL at 08:29

## 2017-01-01 RX ADMIN — IPRATROPIUM BROMIDE 0.5 MG: 0.5 SOLUTION RESPIRATORY (INHALATION) at 13:33

## 2017-01-01 RX ADMIN — IPRATROPIUM BROMIDE 0.5 MG: 0.5 SOLUTION RESPIRATORY (INHALATION) at 19:59

## 2017-01-01 RX ADMIN — OLANZAPINE 2.5 MG: 5 TABLET, FILM COATED ORAL at 21:35

## 2017-01-01 RX ADMIN — LEVALBUTEROL HYDROCHLORIDE 1.25 MG: 1.25 SOLUTION, CONCENTRATE RESPIRATORY (INHALATION) at 19:41

## 2017-01-01 RX ADMIN — ISODIUM CHLORIDE 3 ML: 0.03 SOLUTION RESPIRATORY (INHALATION) at 14:32

## 2017-01-01 RX ADMIN — GABAPENTIN 300 MG: 300 CAPSULE ORAL at 10:01

## 2017-01-01 RX ADMIN — POTASSIUM CHLORIDE 20 MEQ: 750 TABLET, EXTENDED RELEASE ORAL at 10:02

## 2017-01-01 RX ADMIN — HYDROMORPHONE HYDROCHLORIDE 1 MG: 1 INJECTION, SOLUTION INTRAMUSCULAR; INTRAVENOUS; SUBCUTANEOUS at 07:39

## 2017-01-01 RX ADMIN — HYDROMORPHONE HYDROCHLORIDE 0.5 MG: 1 INJECTION, SOLUTION INTRAMUSCULAR; INTRAVENOUS; SUBCUTANEOUS at 21:22

## 2017-01-01 RX ADMIN — OSELTAMIVIR PHOSPHATE 75 MG: 75 CAPSULE ORAL at 20:46

## 2017-01-01 RX ADMIN — GABAPENTIN 300 MG: 300 CAPSULE ORAL at 08:56

## 2017-01-01 RX ADMIN — DOCUSATE SODIUM 100 MG: 100 CAPSULE, LIQUID FILLED ORAL at 10:16

## 2017-01-01 RX ADMIN — OLANZAPINE 5 MG: 5 TABLET, FILM COATED ORAL at 22:37

## 2017-01-01 RX ADMIN — TRAZODONE HYDROCHLORIDE 50 MG: 50 TABLET ORAL at 21:42

## 2017-01-01 RX ADMIN — TAMSULOSIN HYDROCHLORIDE 0.4 MG: 0.4 CAPSULE ORAL at 16:01

## 2017-01-01 RX ADMIN — FLUTICASONE PROPIONATE 1 SPRAY: 50 SPRAY, METERED NASAL at 08:21

## 2017-01-01 RX ADMIN — VALSARTAN: 160 TABLET ORAL at 09:05

## 2017-01-01 RX ADMIN — GLYCOPYRROLATE 0.2 MG: 0.2 INJECTION, SOLUTION INTRAMUSCULAR; INTRAVENOUS at 04:09

## 2017-01-01 RX ADMIN — OXYCODONE HYDROCHLORIDE 30 MG: 10 TABLET ORAL at 01:43

## 2017-01-01 RX ADMIN — LIDOCAINE 1 PATCH: 50 PATCH CUTANEOUS at 21:08

## 2017-01-01 RX ADMIN — FENTANYL CITRATE 50 MCG: 50 INJECTION INTRAMUSCULAR; INTRAVENOUS at 20:07

## 2017-01-01 RX ADMIN — OXYCODONE HYDROCHLORIDE 15 MG: 5 TABLET ORAL at 05:53

## 2017-01-01 RX ADMIN — OXYCODONE HYDROCHLORIDE 30 MG: 10 TABLET ORAL at 00:52

## 2017-01-01 RX ADMIN — LEVALBUTEROL 0.63 MG: 0.63 SOLUTION RESPIRATORY (INHALATION) at 07:40

## 2017-01-01 RX ADMIN — VALSARTAN: 160 TABLET ORAL at 09:26

## 2017-01-01 RX ADMIN — HYDROCODONE POLISTIREX AND CHLORPHENIRAMINE POLISTIREX 5 ML: 10; 8 SUSPENSION, EXTENDED RELEASE ORAL at 09:20

## 2017-01-01 RX ADMIN — ALBUTEROL SULFATE 5 MG: 2.5 SOLUTION RESPIRATORY (INHALATION) at 17:06

## 2017-01-01 RX ADMIN — BUDESONIDE 0.5 MG: 0.5 INHALANT RESPIRATORY (INHALATION) at 19:21

## 2017-01-01 RX ADMIN — OLANZAPINE 5 MG: 5 TABLET, FILM COATED ORAL at 21:50

## 2017-01-01 RX ADMIN — GABAPENTIN 300 MG: 300 CAPSULE ORAL at 21:47

## 2017-01-01 RX ADMIN — ASPIRIN 81 MG: 81 TABLET, CHEWABLE ORAL at 08:35

## 2017-01-01 RX ADMIN — GLYCOPYRROLATE 0.2 MG: 0.2 INJECTION, SOLUTION INTRAMUSCULAR; INTRAVENOUS at 00:22

## 2017-01-01 RX ADMIN — LEVALBUTEROL 0.63 MG: 0.63 SOLUTION RESPIRATORY (INHALATION) at 13:22

## 2017-01-01 RX ADMIN — OXYCODONE HYDROCHLORIDE 15 MG: 5 TABLET ORAL at 18:16

## 2017-01-01 RX ADMIN — ACETAMINOPHEN 975 MG: 325 TABLET, FILM COATED ORAL at 05:01

## 2017-01-01 RX ADMIN — OSELTAMIVIR PHOSPHATE 75 MG: 75 CAPSULE ORAL at 20:27

## 2017-01-01 RX ADMIN — OXYBUTYNIN CHLORIDE 10 MG: 5 TABLET, EXTENDED RELEASE ORAL at 21:47

## 2017-01-01 RX ADMIN — HYDROMORPHONE HYDROCHLORIDE 0.5 MG: 1 INJECTION, SOLUTION INTRAMUSCULAR; INTRAVENOUS; SUBCUTANEOUS at 03:29

## 2017-01-01 RX ADMIN — ATORVASTATIN CALCIUM 40 MG: 40 TABLET, FILM COATED ORAL at 17:42

## 2017-01-01 RX ADMIN — IPRATROPIUM BROMIDE 0.5 MG: 0.5 SOLUTION RESPIRATORY (INHALATION) at 18:18

## 2017-01-01 RX ADMIN — FLUTICASONE PROPIONATE AND SALMETEROL 1 PUFF: 50; 500 POWDER RESPIRATORY (INHALATION) at 08:21

## 2017-01-01 RX ADMIN — GABAPENTIN 300 MG: 300 CAPSULE ORAL at 15:15

## 2017-01-01 RX ADMIN — VANCOMYCIN HYDROCHLORIDE 1250 MG: 1 INJECTION, POWDER, LYOPHILIZED, FOR SOLUTION INTRAVENOUS at 21:43

## 2017-01-01 RX ADMIN — IPRATROPIUM BROMIDE 0.5 MG: 0.5 SOLUTION RESPIRATORY (INHALATION) at 07:13

## 2017-01-01 RX ADMIN — LEVALBUTEROL HYDROCHLORIDE 1.25 MG: 1.25 SOLUTION, CONCENTRATE RESPIRATORY (INHALATION) at 08:29

## 2017-01-01 RX ADMIN — FUROSEMIDE 20 MG: 10 INJECTION, SOLUTION INTRAMUSCULAR; INTRAVENOUS at 16:13

## 2017-01-01 RX ADMIN — LEVALBUTEROL HYDROCHLORIDE 0.63 MG: 0.63 SOLUTION RESPIRATORY (INHALATION) at 13:09

## 2017-01-01 RX ADMIN — LEVALBUTEROL HYDROCHLORIDE 1.25 MG: 1.25 SOLUTION, CONCENTRATE RESPIRATORY (INHALATION) at 13:26

## 2017-01-01 RX ADMIN — DOCUSATE SODIUM 100 MG: 100 CAPSULE, LIQUID FILLED ORAL at 09:12

## 2017-01-01 RX ADMIN — OXYCODONE HYDROCHLORIDE 15 MG: 5 TABLET ORAL at 10:06

## 2017-01-01 RX ADMIN — ATORVASTATIN CALCIUM 40 MG: 40 TABLET, FILM COATED ORAL at 17:23

## 2017-01-01 RX ADMIN — FLUTICASONE PROPIONATE AND SALMETEROL 1 PUFF: 50; 500 POWDER RESPIRATORY (INHALATION) at 08:45

## 2017-01-01 RX ADMIN — OLANZAPINE 2.5 MG: 5 TABLET, FILM COATED ORAL at 21:44

## 2017-01-01 RX ADMIN — ACETAMINOPHEN 650 MG: 325 TABLET, FILM COATED ORAL at 17:04

## 2017-01-01 RX ADMIN — ACETAMINOPHEN 975 MG: 325 TABLET, FILM COATED ORAL at 21:44

## 2017-01-01 RX ADMIN — FENTANYL CITRATE 50 MCG: 50 INJECTION, SOLUTION INTRAMUSCULAR; INTRAVENOUS at 10:35

## 2017-01-01 RX ADMIN — IPRATROPIUM BROMIDE 0.5 MG: 0.5 SOLUTION RESPIRATORY (INHALATION) at 13:14

## 2017-01-01 RX ADMIN — VALSARTAN: 160 TABLET ORAL at 08:46

## 2017-01-01 RX ADMIN — OXYCODONE HYDROCHLORIDE 15 MG: 5 TABLET ORAL at 05:40

## 2017-01-01 RX ADMIN — IPRATROPIUM BROMIDE 0.5 MG: 0.5 SOLUTION RESPIRATORY (INHALATION) at 13:13

## 2017-01-01 RX ADMIN — IOHEXOL 100 ML: 350 INJECTION, SOLUTION INTRAVENOUS at 20:38

## 2017-01-01 RX ADMIN — GUAIFENESIN 1200 MG: 600 TABLET, EXTENDED RELEASE ORAL at 21:47

## 2017-01-01 RX ADMIN — FUROSEMIDE 20 MG: 10 INJECTION, SOLUTION INTRAMUSCULAR; INTRAVENOUS at 10:02

## 2017-01-01 RX ADMIN — DOCUSATE SODIUM 100 MG: 100 CAPSULE, LIQUID FILLED ORAL at 17:23

## 2017-01-01 RX ADMIN — OLANZAPINE 2.5 MG: 5 TABLET, FILM COATED ORAL at 21:01

## 2017-01-01 RX ADMIN — CITALOPRAM HYDROBROMIDE 20 MG: 20 TABLET ORAL at 10:58

## 2017-01-01 RX ADMIN — IPRATROPIUM BROMIDE 0.5 MG: 0.5 SOLUTION RESPIRATORY (INHALATION) at 19:20

## 2017-01-01 RX ADMIN — TAMSULOSIN HYDROCHLORIDE 0.4 MG: 0.4 CAPSULE ORAL at 22:09

## 2017-01-01 RX ADMIN — ACETAMINOPHEN 975 MG: 325 TABLET, FILM COATED ORAL at 22:12

## 2017-01-01 RX ADMIN — HYDROMORPHONE HYDROCHLORIDE 0.5 MG: 1 INJECTION, SOLUTION INTRAMUSCULAR; INTRAVENOUS; SUBCUTANEOUS at 14:38

## 2017-01-01 RX ADMIN — ONDANSETRON 4 MG: 2 INJECTION INTRAMUSCULAR; INTRAVENOUS at 17:02

## 2017-01-01 RX ADMIN — POLYETHYLENE GLYCOL 3350 17 G: 17 POWDER, FOR SOLUTION ORAL at 09:27

## 2017-01-01 RX ADMIN — ACETAMINOPHEN 975 MG: 325 TABLET, FILM COATED ORAL at 21:00

## 2017-01-01 RX ADMIN — ACETYLCYSTEINE 600 MG: 200 SOLUTION ORAL; RESPIRATORY (INHALATION) at 20:18

## 2017-01-01 RX ADMIN — OXYCODONE HYDROCHLORIDE 10 MG: 10 TABLET ORAL at 05:33

## 2017-01-01 RX ADMIN — FUROSEMIDE 20 MG: 10 INJECTION, SOLUTION INTRAMUSCULAR; INTRAVENOUS at 16:09

## 2017-01-01 RX ADMIN — HYDROMORPHONE HYDROCHLORIDE 1 MG: 1 INJECTION, SOLUTION INTRAMUSCULAR; INTRAVENOUS; SUBCUTANEOUS at 02:46

## 2017-01-01 RX ADMIN — LEVALBUTEROL HYDROCHLORIDE 1.25 MG: 1.25 SOLUTION, CONCENTRATE RESPIRATORY (INHALATION) at 07:16

## 2017-01-01 RX ADMIN — OXYCODONE HYDROCHLORIDE 10 MG: 10 TABLET ORAL at 08:33

## 2017-01-01 RX ADMIN — FLUTICASONE PROPIONATE AND SALMETEROL 1 PUFF: 50; 500 POWDER RESPIRATORY (INHALATION) at 08:57

## 2017-01-01 RX ADMIN — CEFEPIME HYDROCHLORIDE 2000 MG: 2 INJECTION, POWDER, FOR SOLUTION INTRAVENOUS at 06:49

## 2017-01-01 RX ADMIN — LEVALBUTEROL 0.63 MG: 0.63 SOLUTION RESPIRATORY (INHALATION) at 07:29

## 2017-01-01 RX ADMIN — MORPHINE SULFATE 2 MG: 2 INJECTION, SOLUTION INTRAMUSCULAR; INTRAVENOUS at 16:29

## 2017-01-01 RX ADMIN — OXYBUTYNIN CHLORIDE 10 MG: 5 TABLET, EXTENDED RELEASE ORAL at 21:38

## 2017-01-01 RX ADMIN — IPRATROPIUM BROMIDE 0.5 MG: 0.5 SOLUTION RESPIRATORY (INHALATION) at 15:04

## 2017-01-01 RX ADMIN — IPRATROPIUM BROMIDE 0.5 MG: 0.5 SOLUTION RESPIRATORY (INHALATION) at 19:23

## 2017-01-01 RX ADMIN — LEVALBUTEROL HYDROCHLORIDE 0.63 MG: 0.63 SOLUTION RESPIRATORY (INHALATION) at 13:18

## 2017-01-01 RX ADMIN — MIDAZOLAM HYDROCHLORIDE 0.5 MG: 1 INJECTION, SOLUTION INTRAMUSCULAR; INTRAVENOUS at 10:50

## 2017-01-01 RX ADMIN — HYDROMORPHONE HYDROCHLORIDE 0.5 MG: 1 INJECTION, SOLUTION INTRAMUSCULAR; INTRAVENOUS; SUBCUTANEOUS at 17:08

## 2017-01-01 RX ADMIN — IPRATROPIUM BROMIDE 0.5 MG: 0.5 SOLUTION RESPIRATORY (INHALATION) at 19:14

## 2017-01-01 RX ADMIN — FENTANYL CITRATE 25 MCG: 50 INJECTION, SOLUTION INTRAMUSCULAR; INTRAVENOUS at 11:03

## 2017-01-01 RX ADMIN — FLUTICASONE PROPIONATE 1 SPRAY: 50 SPRAY, METERED NASAL at 09:37

## 2017-01-01 RX ADMIN — OXYCODONE HYDROCHLORIDE 15 MG: 5 TABLET ORAL at 05:22

## 2017-01-01 RX ADMIN — LIDOCAINE 1 PATCH: 50 PATCH CUTANEOUS at 23:22

## 2017-01-01 RX ADMIN — OLANZAPINE 5 MG: 5 TABLET, FILM COATED ORAL at 02:48

## 2017-01-01 RX ADMIN — POTASSIUM CHLORIDE 20 MEQ: 1500 TABLET, EXTENDED RELEASE ORAL at 08:39

## 2017-01-01 RX ADMIN — OSELTAMIVIR PHOSPHATE 75 MG: 75 CAPSULE ORAL at 08:56

## 2017-01-01 RX ADMIN — PREDNISONE 20 MG: 20 TABLET ORAL at 08:23

## 2017-01-01 RX ADMIN — HYDROMORPHONE HYDROCHLORIDE 0.5 MG: 1 INJECTION, SOLUTION INTRAMUSCULAR; INTRAVENOUS; SUBCUTANEOUS at 07:49

## 2017-01-01 RX ADMIN — OXYCODONE HYDROCHLORIDE 30 MG: 10 TABLET ORAL at 19:14

## 2017-01-01 RX ADMIN — ATORVASTATIN CALCIUM 40 MG: 40 TABLET, FILM COATED ORAL at 17:30

## 2017-01-01 RX ADMIN — LORAZEPAM 1 MG: 2 INJECTION INTRAMUSCULAR; INTRAVENOUS at 19:37

## 2017-01-01 RX ADMIN — IPRATROPIUM BROMIDE 0.5 MG: 0.5 SOLUTION RESPIRATORY (INHALATION) at 13:44

## 2017-01-01 RX ADMIN — LORAZEPAM 1.5 MG: 0.5 TABLET ORAL at 21:08

## 2017-01-01 RX ADMIN — OXYBUTYNIN CHLORIDE 10 MG: 5 TABLET, EXTENDED RELEASE ORAL at 23:11

## 2017-01-01 RX ADMIN — IPRATROPIUM BROMIDE 0.5 MG: 0.5 SOLUTION RESPIRATORY (INHALATION) at 13:03

## 2017-01-01 RX ADMIN — DOCUSATE SODIUM 100 MG: 100 CAPSULE, LIQUID FILLED ORAL at 17:26

## 2017-01-01 RX ADMIN — ASPIRIN 81 MG: 81 TABLET, COATED ORAL at 10:59

## 2017-01-01 RX ADMIN — PREDNISONE 40 MG: 20 TABLET ORAL at 14:47

## 2017-01-01 RX ADMIN — OXYBUTYNIN CHLORIDE 10 MG: 5 TABLET, EXTENDED RELEASE ORAL at 21:29

## 2017-01-01 RX ADMIN — IPRATROPIUM BROMIDE 0.5 MG: 0.5 SOLUTION RESPIRATORY (INHALATION) at 07:14

## 2017-01-01 RX ADMIN — OXYBUTYNIN CHLORIDE 10 MG: 5 TABLET, EXTENDED RELEASE ORAL at 22:05

## 2017-01-01 RX ADMIN — GABAPENTIN 300 MG: 300 CAPSULE ORAL at 09:26

## 2017-01-01 RX ADMIN — ACETAMINOPHEN 975 MG: 325 TABLET, FILM COATED ORAL at 16:52

## 2017-01-01 RX ADMIN — HYDROMORPHONE HYDROCHLORIDE 0.5 MG: 1 INJECTION, SOLUTION INTRAMUSCULAR; INTRAVENOUS; SUBCUTANEOUS at 00:20

## 2017-01-01 RX ADMIN — ACETYLCYSTEINE 600 MG: 200 SOLUTION ORAL; RESPIRATORY (INHALATION) at 07:30

## 2017-01-01 RX ADMIN — VALSARTAN: 160 TABLET ORAL at 09:07

## 2017-01-01 RX ADMIN — POTASSIUM CHLORIDE 20 MEQ: 750 TABLET, EXTENDED RELEASE ORAL at 09:35

## 2017-01-01 RX ADMIN — GABAPENTIN 300 MG: 300 CAPSULE ORAL at 08:23

## 2017-01-01 RX ADMIN — OXYCODONE HYDROCHLORIDE 5 MG: 5 TABLET ORAL at 21:23

## 2017-01-01 RX ADMIN — IPRATROPIUM BROMIDE 0.5 MG: 0.5 SOLUTION RESPIRATORY (INHALATION) at 00:32

## 2017-01-01 RX ADMIN — OXYCODONE HYDROCHLORIDE 30 MG: 10 TABLET ORAL at 20:14

## 2017-01-01 RX ADMIN — IPRATROPIUM BROMIDE 0.5 MG: 0.5 SOLUTION RESPIRATORY (INHALATION) at 13:18

## 2017-01-01 RX ADMIN — POTASSIUM CHLORIDE 20 MEQ: 750 TABLET, EXTENDED RELEASE ORAL at 07:51

## 2017-01-01 RX ADMIN — OXYBUTYNIN CHLORIDE 10 MG: 5 TABLET, EXTENDED RELEASE ORAL at 22:35

## 2017-01-01 RX ADMIN — IPRATROPIUM BROMIDE 0.5 MG: 0.5 SOLUTION RESPIRATORY (INHALATION) at 20:19

## 2017-01-01 RX ADMIN — LORAZEPAM 1 MG: 1 TABLET ORAL at 13:27

## 2017-01-01 RX ADMIN — POTASSIUM CHLORIDE 20 MEQ: 750 TABLET, EXTENDED RELEASE ORAL at 10:16

## 2017-01-01 RX ADMIN — LEVALBUTEROL 0.63 MG: 0.63 SOLUTION RESPIRATORY (INHALATION) at 00:43

## 2017-01-01 RX ADMIN — GABAPENTIN 300 MG: 300 CAPSULE ORAL at 21:32

## 2017-01-01 RX ADMIN — ACETAMINOPHEN 975 MG: 325 TABLET, FILM COATED ORAL at 06:15

## 2017-01-01 RX ADMIN — HYDROMORPHONE HYDROCHLORIDE 0.5 MG: 1 INJECTION, SOLUTION INTRAMUSCULAR; INTRAVENOUS; SUBCUTANEOUS at 19:59

## 2017-01-01 RX ADMIN — OXYCODONE HYDROCHLORIDE 30 MG: 10 TABLET ORAL at 08:29

## 2017-01-01 RX ADMIN — FLUTICASONE PROPIONATE 1 SPRAY: 50 SPRAY, METERED NASAL at 09:27

## 2017-01-01 RX ADMIN — FLUTICASONE PROPIONATE AND SALMETEROL 1 PUFF: 50; 500 POWDER RESPIRATORY (INHALATION) at 09:12

## 2017-01-01 RX ADMIN — IPRATROPIUM BROMIDE 0.5 MG: 0.5 SOLUTION RESPIRATORY (INHALATION) at 13:09

## 2017-01-01 RX ADMIN — OLANZAPINE 5 MG: 5 TABLET, FILM COATED ORAL at 23:12

## 2017-01-01 RX ADMIN — POTASSIUM CHLORIDE 20 MEQ: 1500 TABLET, EXTENDED RELEASE ORAL at 09:09

## 2017-01-01 RX ADMIN — PREDNISONE 20 MG: 20 TABLET ORAL at 08:56

## 2017-01-01 RX ADMIN — LORAZEPAM 1 MG: 1 TABLET ORAL at 03:29

## 2017-01-01 RX ADMIN — CITALOPRAM HYDROBROMIDE 20 MG: 20 TABLET ORAL at 09:12

## 2017-01-01 RX ADMIN — LEVALBUTEROL HYDROCHLORIDE 1.25 MG: 1.25 SOLUTION, CONCENTRATE RESPIRATORY (INHALATION) at 07:13

## 2017-01-01 RX ADMIN — POTASSIUM CHLORIDE 20 MEQ: 750 TABLET, EXTENDED RELEASE ORAL at 10:01

## 2017-01-01 RX ADMIN — ACETAMINOPHEN 975 MG: 325 TABLET, FILM COATED ORAL at 14:01

## 2017-01-01 RX ADMIN — ATORVASTATIN CALCIUM 40 MG: 40 TABLET, FILM COATED ORAL at 17:26

## 2017-01-01 RX ADMIN — METHYLPREDNISOLONE SODIUM SUCCINATE 80 MG: 125 INJECTION, POWDER, FOR SOLUTION INTRAMUSCULAR; INTRAVENOUS at 16:52

## 2017-01-01 RX ADMIN — SENNOSIDES 8.6 MG: 8.6 TABLET, FILM COATED ORAL at 09:12

## 2017-01-01 RX ADMIN — LORAZEPAM 1 MG: 1 TABLET ORAL at 21:28

## 2017-01-01 RX ADMIN — LEVALBUTEROL HYDROCHLORIDE 0.63 MG: 0.63 SOLUTION RESPIRATORY (INHALATION) at 13:33

## 2017-01-01 RX ADMIN — IPRATROPIUM BROMIDE 0.5 MG: 0.5 SOLUTION RESPIRATORY (INHALATION) at 07:29

## 2017-01-01 RX ADMIN — ISODIUM CHLORIDE 3 ML: 0.03 SOLUTION RESPIRATORY (INHALATION) at 20:13

## 2017-01-01 RX ADMIN — DEXAMETHASONE 8 MG: 4 TABLET ORAL at 05:31

## 2017-01-01 RX ADMIN — GUAIFENESIN 1200 MG: 600 TABLET, EXTENDED RELEASE ORAL at 20:46

## 2017-01-01 RX ADMIN — OXYCODONE HYDROCHLORIDE 10 MG: 10 TABLET ORAL at 10:09

## 2017-01-01 RX ADMIN — HYDROMORPHONE HYDROCHLORIDE 1 MG: 1 INJECTION, SOLUTION INTRAMUSCULAR; INTRAVENOUS; SUBCUTANEOUS at 11:35

## 2017-01-01 RX ADMIN — IOHEXOL 70 ML: 350 INJECTION, SOLUTION INTRAVENOUS at 15:27

## 2017-01-01 RX ADMIN — OXYBUTYNIN CHLORIDE 10 MG: 5 TABLET, EXTENDED RELEASE ORAL at 21:19

## 2017-01-01 RX ADMIN — LEVALBUTEROL HYDROCHLORIDE 1.25 MG: 1.25 SOLUTION, CONCENTRATE RESPIRATORY (INHALATION) at 00:33

## 2017-01-01 RX ADMIN — IPRATROPIUM BROMIDE 0.5 MG: 0.5 SOLUTION RESPIRATORY (INHALATION) at 00:28

## 2017-01-01 RX ADMIN — OXYCODONE HYDROCHLORIDE 10 MG: 10 TABLET ORAL at 18:16

## 2017-01-01 RX ADMIN — OXYCODONE HYDROCHLORIDE 10 MG: 10 TABLET ORAL at 13:05

## 2017-01-01 RX ADMIN — LORAZEPAM 1 MG: 1 TABLET ORAL at 10:01

## 2017-01-01 RX ADMIN — HEPARIN SODIUM 5000 UNITS: 5000 INJECTION, SOLUTION INTRAVENOUS; SUBCUTANEOUS at 21:19

## 2017-01-01 RX ADMIN — METHYLPREDNISOLONE SODIUM SUCCINATE 40 MG: 40 INJECTION, POWDER, FOR SOLUTION INTRAMUSCULAR; INTRAVENOUS at 09:26

## 2017-01-01 RX ADMIN — POTASSIUM CHLORIDE 20 MEQ: 200 INJECTION, SOLUTION INTRAVENOUS at 15:27

## 2017-01-01 RX ADMIN — POTASSIUM CHLORIDE 40 MEQ: 1500 TABLET, EXTENDED RELEASE ORAL at 19:35

## 2017-01-01 RX ADMIN — CEFDINIR 300 MG: 300 CAPSULE ORAL at 14:34

## 2017-01-01 RX ADMIN — GABAPENTIN 300 MG: 300 CAPSULE ORAL at 09:36

## 2017-01-01 RX ADMIN — IPRATROPIUM BROMIDE 0.5 MG: 0.5 SOLUTION RESPIRATORY (INHALATION) at 07:22

## 2017-01-01 RX ADMIN — DEXAMETHASONE 8 MG: 4 TABLET ORAL at 13:25

## 2017-01-01 RX ADMIN — POTASSIUM CHLORIDE 40 MEQ: 1500 TABLET, EXTENDED RELEASE ORAL at 15:03

## 2017-01-01 RX ADMIN — GABAPENTIN 300 MG: 300 CAPSULE ORAL at 17:47

## 2017-01-01 RX ADMIN — OXYCODONE HYDROCHLORIDE 15 MG: 5 TABLET ORAL at 17:12

## 2017-01-01 RX ADMIN — TAMSULOSIN HYDROCHLORIDE 0.4 MG: 0.4 CAPSULE ORAL at 17:42

## 2017-01-01 RX ADMIN — VALSARTAN: 160 TABLET ORAL at 08:23

## 2017-01-01 RX ADMIN — ACETYLCYSTEINE 3 ML: 200 SOLUTION ORAL; RESPIRATORY (INHALATION) at 07:13

## 2017-01-01 RX ADMIN — VANCOMYCIN HYDROCHLORIDE 1250 MG: 1 INJECTION, POWDER, LYOPHILIZED, FOR SOLUTION INTRAVENOUS at 06:50

## 2017-01-01 RX ADMIN — HYDROMORPHONE HYDROCHLORIDE 0.5 MG: 1 INJECTION, SOLUTION INTRAMUSCULAR; INTRAVENOUS; SUBCUTANEOUS at 00:21

## 2017-01-01 RX ADMIN — OSELTAMIVIR PHOSPHATE 75 MG: 75 CAPSULE ORAL at 17:26

## 2017-01-01 RX ADMIN — LEVALBUTEROL 0.63 MG: 0.63 SOLUTION RESPIRATORY (INHALATION) at 02:14

## 2017-01-01 RX ADMIN — SENNOSIDES 8.6 MG: 8.6 TABLET, FILM COATED ORAL at 09:09

## 2017-01-01 RX ADMIN — OXYCODONE HYDROCHLORIDE 10 MG: 10 TABLET ORAL at 22:17

## 2017-01-01 RX ADMIN — IPRATROPIUM BROMIDE 0.5 MG: 0.5 SOLUTION RESPIRATORY (INHALATION) at 19:50

## 2017-01-01 RX ADMIN — FLUTICASONE PROPIONATE 1 SPRAY: 50 SPRAY, METERED NASAL at 21:29

## 2017-01-01 RX ADMIN — CEFDINIR 300 MG: 300 CAPSULE ORAL at 08:46

## 2017-01-01 RX ADMIN — GUAIFENESIN 1200 MG: 600 TABLET, EXTENDED RELEASE ORAL at 12:08

## 2017-01-01 RX ADMIN — OSELTAMIVIR PHOSPHATE 75 MG: 75 CAPSULE ORAL at 09:26

## 2017-01-01 RX ADMIN — LEVALBUTEROL HYDROCHLORIDE 1.25 MG: 1.25 SOLUTION, CONCENTRATE RESPIRATORY (INHALATION) at 19:27

## 2017-01-01 RX ADMIN — ALBUTEROL SULFATE 5 MG: 2.5 SOLUTION RESPIRATORY (INHALATION) at 15:04

## 2017-01-01 RX ADMIN — Medication 300 UNITS: at 15:37

## 2017-01-01 RX ADMIN — DOCUSATE SODIUM 100 MG: 100 CAPSULE, LIQUID FILLED ORAL at 09:09

## 2017-01-01 RX ADMIN — Medication 300 UNITS: at 20:50

## 2017-01-01 RX ADMIN — ENOXAPARIN SODIUM 40 MG: 40 INJECTION SUBCUTANEOUS at 07:50

## 2017-01-01 RX ADMIN — POTASSIUM CHLORIDE 20 MEQ: 1500 TABLET, EXTENDED RELEASE ORAL at 08:24

## 2017-01-01 RX ADMIN — IPRATROPIUM BROMIDE 0.5 MG: 0.5 SOLUTION RESPIRATORY (INHALATION) at 19:15

## 2017-01-01 RX ADMIN — FLUTICASONE PROPIONATE 1 SPRAY: 50 SPRAY, METERED NASAL at 09:42

## 2017-01-01 RX ADMIN — GABAPENTIN 300 MG: 300 CAPSULE ORAL at 17:52

## 2017-01-01 RX ADMIN — LORAZEPAM 1 MG: 1 TABLET ORAL at 18:16

## 2017-01-01 RX ADMIN — LEVALBUTEROL HYDROCHLORIDE 1.25 MG: 1.25 SOLUTION, CONCENTRATE RESPIRATORY (INHALATION) at 19:21

## 2017-01-01 RX ADMIN — FUROSEMIDE 20 MG: 10 INJECTION, SOLUTION INTRAMUSCULAR; INTRAVENOUS at 10:16

## 2017-01-01 RX ADMIN — BUDESONIDE 0.5 MG: 0.5 INHALANT RESPIRATORY (INHALATION) at 19:14

## 2017-01-01 RX ADMIN — GABAPENTIN 300 MG: 300 CAPSULE ORAL at 15:27

## 2017-01-01 RX ADMIN — DOCUSATE SODIUM 100 MG: 100 CAPSULE, LIQUID FILLED ORAL at 18:16

## 2017-01-01 RX ADMIN — SODIUM CHLORIDE 75 ML/HR: 0.9 INJECTION, SOLUTION INTRAVENOUS at 15:08

## 2017-01-01 RX ADMIN — ATORVASTATIN CALCIUM 40 MG: 40 TABLET, FILM COATED ORAL at 20:46

## 2017-01-01 RX ADMIN — ALBUTEROL SULFATE 5 MG: 2.5 SOLUTION RESPIRATORY (INHALATION) at 19:32

## 2017-01-01 RX ADMIN — TAMSULOSIN HYDROCHLORIDE 0.4 MG: 0.4 CAPSULE ORAL at 16:13

## 2017-01-01 RX ADMIN — IPRATROPIUM BROMIDE 0.5 MG: 0.5 SOLUTION RESPIRATORY (INHALATION) at 07:01

## 2017-01-01 RX ADMIN — FUROSEMIDE 20 MG: 10 INJECTION, SOLUTION INTRAMUSCULAR; INTRAVENOUS at 07:49

## 2017-01-01 RX ADMIN — HYDROMORPHONE HYDROCHLORIDE 0.5 MG: 1 INJECTION, SOLUTION INTRAMUSCULAR; INTRAVENOUS; SUBCUTANEOUS at 16:03

## 2017-01-01 RX ADMIN — DOCUSATE SODIUM 100 MG: 100 CAPSULE, LIQUID FILLED ORAL at 09:14

## 2017-01-01 RX ADMIN — GABAPENTIN 300 MG: 300 CAPSULE ORAL at 12:08

## 2017-01-01 RX ADMIN — LEVALBUTEROL HYDROCHLORIDE 0.63 MG: 0.63 SOLUTION RESPIRATORY (INHALATION) at 07:35

## 2017-01-01 RX ADMIN — DOCUSATE SODIUM 100 MG: 100 CAPSULE, LIQUID FILLED ORAL at 09:35

## 2017-01-01 RX ADMIN — BUDESONIDE 0.5 MG: 0.5 INHALANT RESPIRATORY (INHALATION) at 07:30

## 2017-01-01 RX ADMIN — SODIUM CHLORIDE 75 ML/HR: 0.9 INJECTION, SOLUTION INTRAVENOUS at 23:31

## 2017-01-01 RX ADMIN — LORAZEPAM 1 MG: 1 TABLET ORAL at 16:17

## 2017-01-01 RX ADMIN — GABAPENTIN 300 MG: 300 CAPSULE ORAL at 20:56

## 2017-01-01 RX ADMIN — HEPARIN SODIUM 5000 UNITS: 5000 INJECTION, SOLUTION INTRAVENOUS; SUBCUTANEOUS at 13:46

## 2017-01-01 RX ADMIN — ALBUTEROL SULFATE 2.5 MG: 2.5 SOLUTION RESPIRATORY (INHALATION) at 20:27

## 2017-01-01 RX ADMIN — ALBUTEROL SULFATE 2 PUFF: 90 AEROSOL, METERED RESPIRATORY (INHALATION) at 06:12

## 2017-01-01 RX ADMIN — GABAPENTIN 300 MG: 300 CAPSULE ORAL at 09:07

## 2017-01-01 RX ADMIN — LORAZEPAM 1 MG: 1 TABLET ORAL at 09:16

## 2017-01-01 RX ADMIN — OLANZAPINE 2.5 MG: 5 TABLET, FILM COATED ORAL at 21:39

## 2017-01-01 RX ADMIN — LEVALBUTEROL HYDROCHLORIDE 1.25 MG: 1.25 SOLUTION, CONCENTRATE RESPIRATORY (INHALATION) at 13:11

## 2017-01-01 RX ADMIN — OXYCODONE HYDROCHLORIDE 15 MG: 5 TABLET ORAL at 18:28

## 2017-01-01 RX ADMIN — OXYCODONE HYDROCHLORIDE 15 MG: 5 TABLET ORAL at 10:00

## 2017-01-01 RX ADMIN — GUAIFENESIN 1200 MG: 600 TABLET, EXTENDED RELEASE ORAL at 09:09

## 2017-01-01 RX ADMIN — IPRATROPIUM BROMIDE 0.5 MG: 0.5 SOLUTION RESPIRATORY (INHALATION) at 00:56

## 2017-01-01 RX ADMIN — OXYCODONE HYDROCHLORIDE 15 MG: 5 TABLET ORAL at 14:01

## 2017-01-01 RX ADMIN — TRAZODONE HYDROCHLORIDE 50 MG: 50 TABLET ORAL at 21:00

## 2017-01-01 RX ADMIN — Medication 80 MG: at 12:17

## 2017-01-01 RX ADMIN — LEVALBUTEROL HYDROCHLORIDE 0.63 MG: 0.63 SOLUTION RESPIRATORY (INHALATION) at 07:14

## 2017-01-01 RX ADMIN — ACETYLCYSTEINE 600 MG: 200 SOLUTION ORAL; RESPIRATORY (INHALATION) at 13:44

## 2017-01-01 RX ADMIN — LORAZEPAM 1 MG: 2 INJECTION INTRAMUSCULAR; INTRAVENOUS at 22:27

## 2017-01-01 RX ADMIN — HYDROCODONE POLISTIREX AND CHLORPHENIRAMINE POLISTIREX 5 ML: 10; 8 SUSPENSION, EXTENDED RELEASE ORAL at 13:06

## 2017-01-01 RX ADMIN — IPRATROPIUM BROMIDE 0.5 MG: 0.5 SOLUTION RESPIRATORY (INHALATION) at 00:43

## 2017-01-01 RX ADMIN — OXYBUTYNIN CHLORIDE 10 MG: 5 TABLET, EXTENDED RELEASE ORAL at 21:32

## 2017-01-01 RX ADMIN — Medication 300 UNITS: at 15:20

## 2017-01-01 RX ADMIN — BUDESONIDE 0.5 MG: 0.5 INHALANT RESPIRATORY (INHALATION) at 19:41

## 2017-01-01 RX ADMIN — METHYLPREDNISOLONE SODIUM SUCCINATE 40 MG: 40 INJECTION, POWDER, FOR SOLUTION INTRAMUSCULAR; INTRAVENOUS at 21:00

## 2017-01-01 RX ADMIN — GABAPENTIN 300 MG: 300 CAPSULE ORAL at 17:23

## 2017-01-01 RX ADMIN — LEVALBUTEROL 0.63 MG: 0.63 SOLUTION RESPIRATORY (INHALATION) at 20:00

## 2017-01-01 RX ADMIN — TRAZODONE HYDROCHLORIDE 50 MG: 50 TABLET ORAL at 23:34

## 2017-01-01 RX ADMIN — CEFEPIME 2000 MG: 2 INJECTION, POWDER, FOR SOLUTION INTRAMUSCULAR; INTRAVENOUS at 19:36

## 2017-01-01 RX ADMIN — METHYLPREDNISOLONE SODIUM SUCCINATE 40 MG: 40 INJECTION, POWDER, FOR SOLUTION INTRAMUSCULAR; INTRAVENOUS at 21:47

## 2017-01-01 RX ADMIN — CEFDINIR 300 MG: 300 CAPSULE ORAL at 22:04

## 2017-01-01 RX ADMIN — OLANZAPINE 5 MG: 5 TABLET, FILM COATED ORAL at 22:10

## 2017-01-01 RX ADMIN — LEVALBUTEROL HYDROCHLORIDE 0.63 MG: 0.63 SOLUTION RESPIRATORY (INHALATION) at 19:15

## 2017-01-01 RX ADMIN — LEVALBUTEROL HYDROCHLORIDE 1.25 MG: 1.25 SOLUTION, CONCENTRATE RESPIRATORY (INHALATION) at 14:31

## 2017-01-01 RX ADMIN — TRAZODONE HYDROCHLORIDE 50 MG: 50 TABLET ORAL at 22:05

## 2017-01-01 RX ADMIN — OXYBUTYNIN CHLORIDE 10 MG: 5 TABLET, EXTENDED RELEASE ORAL at 21:49

## 2017-01-01 RX ADMIN — DOCUSATE SODIUM 100 MG: 100 CAPSULE, LIQUID FILLED ORAL at 17:42

## 2017-01-01 RX ADMIN — IPRATROPIUM BROMIDE 0.5 MG: 0.5 SOLUTION RESPIRATORY (INHALATION) at 13:50

## 2017-01-01 RX ADMIN — ENOXAPARIN SODIUM 40 MG: 40 INJECTION SUBCUTANEOUS at 09:25

## 2017-01-01 RX ADMIN — DOCUSATE SODIUM 100 MG: 100 CAPSULE, LIQUID FILLED ORAL at 10:01

## 2017-01-01 RX ADMIN — ACETAMINOPHEN 650 MG: 325 TABLET, FILM COATED ORAL at 18:09

## 2017-01-01 RX ADMIN — LORAZEPAM 1 MG: 1 TABLET ORAL at 22:13

## 2017-01-01 RX ADMIN — SENNOSIDES 8.6 MG: 8.6 TABLET, FILM COATED ORAL at 08:23

## 2017-01-01 RX ADMIN — DEXAMETHASONE 8 MG: 4 TABLET ORAL at 05:22

## 2017-01-01 RX ADMIN — METHYLPREDNISOLONE SODIUM SUCCINATE 40 MG: 40 INJECTION, POWDER, FOR SOLUTION INTRAMUSCULAR; INTRAVENOUS at 13:49

## 2017-01-01 RX ADMIN — LEVALBUTEROL HYDROCHLORIDE 1.25 MG: 1.25 SOLUTION, CONCENTRATE RESPIRATORY (INHALATION) at 08:19

## 2017-01-01 RX ADMIN — FLUTICASONE PROPIONATE 1 SPRAY: 50 SPRAY, METERED NASAL at 12:08

## 2017-01-01 RX ADMIN — LORAZEPAM 1 MG: 1 TABLET ORAL at 08:32

## 2017-01-01 RX ADMIN — LORAZEPAM 1 MG: 2 INJECTION INTRAMUSCULAR; INTRAVENOUS at 16:29

## 2017-01-01 RX ADMIN — FLUTICASONE PROPIONATE 1 SPRAY: 50 SPRAY, METERED NASAL at 08:57

## 2017-01-01 RX ADMIN — OXYCODONE HYDROCHLORIDE 10 MG: 10 TABLET ORAL at 11:16

## 2017-01-01 RX ADMIN — HYDROMORPHONE HYDROCHLORIDE 0.5 MG: 1 INJECTION, SOLUTION INTRAMUSCULAR; INTRAVENOUS; SUBCUTANEOUS at 09:45

## 2017-01-01 RX ADMIN — VALSARTAN: 160 TABLET ORAL at 09:43

## 2017-01-01 RX ADMIN — SODIUM CHLORIDE 20 ML/HR: 0.9 INJECTION, SOLUTION INTRAVENOUS at 14:00

## 2017-01-01 RX ADMIN — ACETYLCYSTEINE 600 MG: 200 SOLUTION ORAL; RESPIRATORY (INHALATION) at 07:40

## 2017-01-01 RX ADMIN — TAMSULOSIN HYDROCHLORIDE 0.4 MG: 0.4 CAPSULE ORAL at 17:23

## 2017-01-01 RX ADMIN — ALTEPLASE 2 MG: 2.2 INJECTION, POWDER, LYOPHILIZED, FOR SOLUTION INTRAVENOUS at 22:12

## 2017-01-01 RX ADMIN — ENOXAPARIN SODIUM 40 MG: 40 INJECTION SUBCUTANEOUS at 10:17

## 2017-01-01 RX ADMIN — OXYBUTYNIN CHLORIDE 10 MG: 5 TABLET, EXTENDED RELEASE ORAL at 21:21

## 2017-01-01 RX ADMIN — TRAZODONE HYDROCHLORIDE 50 MG: 50 TABLET ORAL at 22:09

## 2017-01-01 RX ADMIN — METHYLPREDNISOLONE SODIUM SUCCINATE 60 MG: 125 INJECTION, POWDER, FOR SOLUTION INTRAMUSCULAR; INTRAVENOUS at 20:10

## 2017-01-01 RX ADMIN — HYDROMORPHONE HYDROCHLORIDE 0.5 MG: 1 INJECTION, SOLUTION INTRAMUSCULAR; INTRAVENOUS; SUBCUTANEOUS at 18:10

## 2017-01-01 RX ADMIN — METHYLPREDNISOLONE SODIUM SUCCINATE 40 MG: 40 INJECTION, POWDER, FOR SOLUTION INTRAMUSCULAR; INTRAVENOUS at 05:01

## 2017-01-01 RX ADMIN — FUROSEMIDE 20 MG: 10 INJECTION, SOLUTION INTRAMUSCULAR; INTRAVENOUS at 15:56

## 2017-01-01 RX ADMIN — IPRATROPIUM BROMIDE 0.5 MG: 0.5 SOLUTION RESPIRATORY (INHALATION) at 19:41

## 2017-01-01 RX ADMIN — METHYLPREDNISOLONE SODIUM SUCCINATE 80 MG: 125 INJECTION, POWDER, FOR SOLUTION INTRAMUSCULAR; INTRAVENOUS at 05:19

## 2017-01-01 RX ADMIN — OLANZAPINE 5 MG: 5 TABLET, FILM COATED ORAL at 21:22

## 2017-01-01 RX ADMIN — TAMSULOSIN HYDROCHLORIDE 0.4 MG: 0.4 CAPSULE ORAL at 17:47

## 2017-01-01 RX ADMIN — DOCUSATE SODIUM 100 MG: 100 CAPSULE, LIQUID FILLED ORAL at 08:56

## 2017-01-01 RX ADMIN — IPRATROPIUM BROMIDE 0.5 MG: 0.5 SOLUTION RESPIRATORY (INHALATION) at 07:16

## 2017-01-01 RX ADMIN — GUAIFENESIN 1200 MG: 600 TABLET, EXTENDED RELEASE ORAL at 22:05

## 2017-01-01 RX ADMIN — ENOXAPARIN SODIUM 40 MG: 40 INJECTION SUBCUTANEOUS at 09:26

## 2017-01-01 RX ADMIN — DEXAMETHASONE 8 MG: 4 TABLET ORAL at 21:29

## 2017-01-01 RX ADMIN — HYDROCODONE POLISTIREX AND CHLORPHENIRAMINE POLISTIREX 5 ML: 10; 8 SUSPENSION, EXTENDED RELEASE ORAL at 12:00

## 2017-01-01 RX ADMIN — TAMSULOSIN HYDROCHLORIDE 0.4 MG: 0.4 CAPSULE ORAL at 16:11

## 2017-01-01 RX ADMIN — HYDROMORPHONE HYDROCHLORIDE 0.5 MG: 1 INJECTION, SOLUTION INTRAMUSCULAR; INTRAVENOUS; SUBCUTANEOUS at 13:25

## 2017-01-01 RX ADMIN — OXYBUTYNIN CHLORIDE 10 MG: 5 TABLET, EXTENDED RELEASE ORAL at 21:00

## 2017-01-01 RX ADMIN — GABAPENTIN 300 MG: 300 CAPSULE ORAL at 20:26

## 2017-01-01 RX ADMIN — OXYBUTYNIN CHLORIDE 10 MG: 5 TABLET, EXTENDED RELEASE ORAL at 21:07

## 2017-01-01 RX ADMIN — VALSARTAN: 160 TABLET ORAL at 08:36

## 2017-01-01 RX ADMIN — OXYCODONE HYDROCHLORIDE 15 MG: 5 TABLET ORAL at 17:10

## 2017-01-01 RX ADMIN — LEVALBUTEROL HYDROCHLORIDE 1.25 MG: 1.25 SOLUTION, CONCENTRATE RESPIRATORY (INHALATION) at 07:28

## 2017-01-01 RX ADMIN — Medication 4 MG/HR: at 16:03

## 2017-01-01 RX ADMIN — VALSARTAN: 160 TABLET ORAL at 09:27

## 2017-01-01 RX ADMIN — LEVALBUTEROL 0.63 MG: 0.63 SOLUTION RESPIRATORY (INHALATION) at 13:44

## 2017-01-01 RX ADMIN — HYDROCODONE POLISTIREX AND CHLORPHENIRAMINE POLISTIREX 5 ML: 10; 8 SUSPENSION, EXTENDED RELEASE ORAL at 08:55

## 2017-01-01 RX ADMIN — METHYLPREDNISOLONE SODIUM SUCCINATE 40 MG: 40 INJECTION, POWDER, FOR SOLUTION INTRAMUSCULAR; INTRAVENOUS at 21:07

## 2017-01-01 RX ADMIN — GABAPENTIN 300 MG: 300 CAPSULE ORAL at 21:45

## 2017-01-01 RX ADMIN — OXYCODONE HYDROCHLORIDE 30 MG: 10 TABLET ORAL at 12:46

## 2017-01-01 RX ADMIN — ACETAMINOPHEN 975 MG: 325 TABLET, FILM COATED ORAL at 05:31

## 2017-01-01 RX ADMIN — TRAZODONE HYDROCHLORIDE 50 MG: 50 TABLET ORAL at 21:44

## 2017-01-01 RX ADMIN — TAMSULOSIN HYDROCHLORIDE 0.4 MG: 0.4 CAPSULE ORAL at 16:08

## 2017-01-01 RX ADMIN — ACETAMINOPHEN 975 MG: 325 TABLET, FILM COATED ORAL at 05:23

## 2017-01-01 RX ADMIN — HYDROMORPHONE HYDROCHLORIDE 0.5 MG: 1 INJECTION, SOLUTION INTRAMUSCULAR; INTRAVENOUS; SUBCUTANEOUS at 21:37

## 2017-01-01 RX ADMIN — LEVALBUTEROL HYDROCHLORIDE 1.25 MG: 1.25 SOLUTION, CONCENTRATE RESPIRATORY (INHALATION) at 13:03

## 2017-01-01 RX ADMIN — FLUTICASONE PROPIONATE 1 SPRAY: 50 SPRAY, METERED NASAL at 09:05

## 2017-01-01 RX ADMIN — OXYCODONE HYDROCHLORIDE 10 MG: 10 TABLET ORAL at 22:19

## 2017-01-01 RX ADMIN — TRAZODONE HYDROCHLORIDE 50 MG: 50 TABLET ORAL at 22:13

## 2017-01-01 RX ADMIN — IPRATROPIUM BROMIDE 0.5 MG: 0.5 SOLUTION RESPIRATORY (INHALATION) at 19:09

## 2017-01-01 RX ADMIN — ATORVASTATIN CALCIUM 40 MG: 40 TABLET, FILM COATED ORAL at 23:44

## 2017-01-01 RX ADMIN — IPRATROPIUM BROMIDE 0.5 MG: 0.5 SOLUTION RESPIRATORY (INHALATION) at 13:11

## 2017-01-01 RX ADMIN — LEVALBUTEROL HYDROCHLORIDE 1.25 MG: 1.25 SOLUTION, CONCENTRATE RESPIRATORY (INHALATION) at 13:15

## 2017-01-01 RX ADMIN — ACETAMINOPHEN 975 MG: 325 TABLET, FILM COATED ORAL at 14:38

## 2017-01-01 RX ADMIN — IPRATROPIUM BROMIDE 0.5 MG: 0.5 SOLUTION RESPIRATORY (INHALATION) at 07:30

## 2017-01-01 RX ADMIN — FLUTICASONE PROPIONATE AND SALMETEROL 1 PUFF: 50; 500 POWDER RESPIRATORY (INHALATION) at 09:26

## 2017-01-01 RX ADMIN — GLYCOPYRROLATE 0.2 MG: 0.2 INJECTION, SOLUTION INTRAMUSCULAR; INTRAVENOUS at 15:50

## 2017-01-01 RX ADMIN — TAMSULOSIN HYDROCHLORIDE 0.4 MG: 0.4 CAPSULE ORAL at 15:15

## 2017-01-01 RX ADMIN — METHYLPREDNISOLONE SODIUM SUCCINATE 40 MG: 40 INJECTION, POWDER, FOR SOLUTION INTRAMUSCULAR; INTRAVENOUS at 06:19

## 2017-01-01 RX ADMIN — PREDNISOLONE ACETATE 1 DROP: 10 SUSPENSION/ DROPS OPHTHALMIC at 17:33

## 2017-01-01 RX ADMIN — BUDESONIDE 0.5 MG: 0.5 INHALANT RESPIRATORY (INHALATION) at 19:31

## 2017-01-01 RX ADMIN — TIOTROPIUM BROMIDE 18 MCG: 18 CAPSULE ORAL; RESPIRATORY (INHALATION) at 10:17

## 2017-01-01 RX ADMIN — GABAPENTIN 300 MG: 300 CAPSULE ORAL at 22:09

## 2017-01-01 RX ADMIN — LEVALBUTEROL HYDROCHLORIDE 1.25 MG: 1.25 SOLUTION, CONCENTRATE RESPIRATORY (INHALATION) at 07:56

## 2017-01-01 RX ADMIN — POTASSIUM CHLORIDE 20 MEQ: 1500 TABLET, EXTENDED RELEASE ORAL at 08:56

## 2017-01-01 RX ADMIN — ATORVASTATIN CALCIUM 40 MG: 40 TABLET, FILM COATED ORAL at 18:14

## 2017-01-01 RX ADMIN — FLUTICASONE PROPIONATE 2 SPRAY: 50 SPRAY, METERED NASAL at 10:59

## 2017-01-01 RX ADMIN — METHYLPREDNISOLONE SODIUM SUCCINATE 40 MG: 40 INJECTION, POWDER, FOR SOLUTION INTRAMUSCULAR; INTRAVENOUS at 20:27

## 2017-01-01 RX ADMIN — OXYCODONE HYDROCHLORIDE 15 MG: 5 TABLET ORAL at 16:11

## 2017-01-01 RX ADMIN — LEVALBUTEROL HYDROCHLORIDE 1.25 MG: 1.25 SOLUTION, CONCENTRATE RESPIRATORY (INHALATION) at 07:00

## 2017-01-01 RX ADMIN — PANTOPRAZOLE SODIUM 40 MG: 40 TABLET, DELAYED RELEASE ORAL at 05:51

## 2017-01-01 RX ADMIN — OLANZAPINE 5 MG: 5 TABLET, FILM COATED ORAL at 21:19

## 2017-01-01 RX ADMIN — MIDAZOLAM HYDROCHLORIDE 0.5 MG: 1 INJECTION, SOLUTION INTRAMUSCULAR; INTRAVENOUS at 10:58

## 2017-01-01 RX ADMIN — BENZONATATE 200 MG: 100 CAPSULE ORAL at 19:59

## 2017-01-01 RX ADMIN — OXYCODONE HYDROCHLORIDE 10 MG: 10 TABLET ORAL at 13:41

## 2017-01-01 RX ADMIN — OXYCODONE HYDROCHLORIDE 15 MG: 5 TABLET ORAL at 16:15

## 2017-01-01 RX ADMIN — OXYCODONE HYDROCHLORIDE 10 MG: 10 TABLET ORAL at 15:27

## 2017-01-01 RX ADMIN — FENTANYL CITRATE 25 MCG: 50 INJECTION, SOLUTION INTRAMUSCULAR; INTRAVENOUS at 10:58

## 2017-01-01 RX ADMIN — ACETAMINOPHEN 975 MG: 325 TABLET, FILM COATED ORAL at 21:39

## 2017-01-01 RX ADMIN — GABAPENTIN 300 MG: 300 CAPSULE ORAL at 21:08

## 2017-01-01 RX ADMIN — METHYLPREDNISOLONE SODIUM SUCCINATE 40 MG: 40 INJECTION, POWDER, FOR SOLUTION INTRAMUSCULAR; INTRAVENOUS at 21:45

## 2017-01-01 RX ADMIN — LEVALBUTEROL HYDROCHLORIDE 1.25 MG: 1.25 SOLUTION, CONCENTRATE RESPIRATORY (INHALATION) at 19:14

## 2017-01-01 RX ADMIN — LORAZEPAM 1 MG: 1 TABLET ORAL at 17:30

## 2017-01-01 RX ADMIN — MIDAZOLAM HYDROCHLORIDE 1 MG: 1 INJECTION, SOLUTION INTRAMUSCULAR; INTRAVENOUS at 10:34

## 2017-01-01 RX ADMIN — IPRATROPIUM BROMIDE 0.5 MG: 0.5 SOLUTION RESPIRATORY (INHALATION) at 19:32

## 2017-01-01 RX ADMIN — LORAZEPAM 1 MG: 2 INJECTION INTRAMUSCULAR; INTRAVENOUS at 03:08

## 2017-01-01 RX ADMIN — ACETAMINOPHEN 975 MG: 325 TABLET, FILM COATED ORAL at 23:11

## 2017-01-01 RX ADMIN — SODIUM CHLORIDE 75 ML/HR: 0.9 INJECTION, SOLUTION INTRAVENOUS at 01:43

## 2017-01-01 RX ADMIN — METHYLPREDNISOLONE SODIUM SUCCINATE 80 MG: 125 INJECTION, POWDER, FOR SOLUTION INTRAMUSCULAR; INTRAVENOUS at 23:13

## 2017-01-01 RX ADMIN — SODIUM CHLORIDE 1000 ML: 0.9 INJECTION, SOLUTION INTRAVENOUS at 15:12

## 2017-01-01 RX ADMIN — METHADONE HYDROCHLORIDE 5 MG: 5 SOLUTION ORAL at 19:06

## 2017-01-01 RX ADMIN — ENOXAPARIN SODIUM 40 MG: 40 INJECTION SUBCUTANEOUS at 09:09

## 2017-01-01 RX ADMIN — OXYCODONE HYDROCHLORIDE 10 MG: 10 TABLET ORAL at 16:59

## 2017-01-01 RX ADMIN — HEPARIN SODIUM 5000 UNITS: 5000 INJECTION, SOLUTION INTRAVENOUS; SUBCUTANEOUS at 22:14

## 2017-01-01 RX ADMIN — POTASSIUM CHLORIDE 20 MEQ: 1500 TABLET, EXTENDED RELEASE ORAL at 09:26

## 2017-01-01 RX ADMIN — LIDOCAINE 1 PATCH: 50 PATCH CUTANEOUS at 21:01

## 2017-01-01 RX ADMIN — GUAIFENESIN 1200 MG: 600 TABLET, EXTENDED RELEASE ORAL at 09:26

## 2017-01-01 RX ADMIN — VALSARTAN: 160 TABLET ORAL at 10:58

## 2017-01-01 RX ADMIN — HYDROCODONE POLISTIREX AND CHLORPHENIRAMINE POLISTIREX 5 ML: 10; 8 SUSPENSION, EXTENDED RELEASE ORAL at 22:35

## 2017-01-01 RX ADMIN — SODIUM CHLORIDE 2328 ML: 0.9 INJECTION, SOLUTION INTRAVENOUS at 17:01

## 2017-01-01 RX ADMIN — POTASSIUM CHLORIDE 20 MEQ: 750 TABLET, EXTENDED RELEASE ORAL at 10:58

## 2017-01-01 RX ADMIN — ENOXAPARIN SODIUM 40 MG: 40 INJECTION SUBCUTANEOUS at 09:34

## 2017-01-01 RX ADMIN — GABAPENTIN 300 MG: 300 CAPSULE ORAL at 21:19

## 2017-01-01 RX ADMIN — OLANZAPINE 2.5 MG: 5 TABLET, FILM COATED ORAL at 22:11

## 2017-01-01 RX ADMIN — TRAZODONE HYDROCHLORIDE 50 MG: 50 TABLET ORAL at 21:29

## 2017-01-01 RX ADMIN — OXYCODONE HYDROCHLORIDE 10 MG: 10 TABLET ORAL at 09:42

## 2017-01-01 RX ADMIN — GUAIFENESIN 1200 MG: 600 TABLET, EXTENDED RELEASE ORAL at 09:42

## 2017-01-01 RX ADMIN — HYDROMORPHONE HYDROCHLORIDE 0.5 MG: 1 INJECTION, SOLUTION INTRAMUSCULAR; INTRAVENOUS; SUBCUTANEOUS at 16:43

## 2017-01-01 RX ADMIN — LEVALBUTEROL HYDROCHLORIDE 1.25 MG: 1.25 SOLUTION, CONCENTRATE RESPIRATORY (INHALATION) at 13:50

## 2017-01-01 RX ADMIN — VALSARTAN: 160 TABLET ORAL at 08:56

## 2017-01-01 RX ADMIN — LIDOCAINE 1 PATCH: 50 PATCH CUTANEOUS at 18:28

## 2017-01-01 RX ADMIN — FLUTICASONE PROPIONATE 1 SPRAY: 50 SPRAY, METERED NASAL at 08:39

## 2017-01-01 RX ADMIN — FLUTICASONE PROPIONATE AND SALMETEROL 1 PUFF: 50; 500 POWDER RESPIRATORY (INHALATION) at 08:37

## 2017-01-01 RX ADMIN — CITALOPRAM HYDROBROMIDE 20 MG: 20 TABLET ORAL at 08:34

## 2017-01-01 RX ADMIN — LEVALBUTEROL HYDROCHLORIDE 1.25 MG: 1.25 SOLUTION, CONCENTRATE RESPIRATORY (INHALATION) at 07:27

## 2017-01-01 RX ADMIN — LIDOCAINE 1 PATCH: 50 PATCH CUTANEOUS at 21:44

## 2017-03-06 PROBLEM — J06.9 URI (UPPER RESPIRATORY INFECTION): Status: ACTIVE | Noted: 2017-01-01

## 2017-03-07 PROBLEM — J90 PLEURAL EFFUSION: Status: ACTIVE | Noted: 2017-01-01

## 2017-04-14 PROBLEM — R65.10 SIRS (SYSTEMIC INFLAMMATORY RESPONSE SYNDROME) (HCC): Status: ACTIVE | Noted: 2017-01-01

## 2017-04-18 PROBLEM — J10.1 INFLUENZA B: Status: ACTIVE | Noted: 2017-01-01

## 2017-04-18 PROBLEM — A41.9 SEPSIS, UNSPECIFIED: Status: ACTIVE | Noted: 2017-01-01

## 2017-04-22 PROBLEM — J10.1 INFLUENZA B: Status: RESOLVED | Noted: 2017-01-01 | Resolved: 2017-01-01

## 2017-05-10 NOTE — PLAN OF CARE
Problem: Potential for Falls  Goal: Patient will remain free of falls  INTERVENTIONS:  - Assess patient frequently for physical needs  - Identify cognitive and physical deficits and behaviors that affect risk of falls    - [place] fall precautions as indicated by assessment   - Educate patient/family on patient safety including physical limitations  - Instruct patient to call for assistance with activity based on assessment  - Modify environment to reduce risk of injury  - Consider OT/PT consult to assist with strengthening/mobility   Outcome: Progressing

## 2017-05-11 NOTE — PLAN OF CARE
Problem: Potential for Falls  Goal: Patient will remain free of falls  INTERVENTIONS:  - Assess patient frequently for physical needs  - Identify cognitive and physical deficits and behaviors that affect risk of falls    - [place] fall precautions as indicated by assessment   - Educate patient/family on patient safety including physical limitations  - Instruct patient to call for assistance with activity based on assessment  - Modify environment to reduce risk of injury  - Consider OT/PT consult to assist with strengthening/mobility   Outcome: Progressing

## 2017-05-26 NOTE — PROGRESS NOTES
Called and spoke to [facility], covering for Dr March Standing patients that the opdivo dose is flat dose of 240 mg

## 2017-05-26 NOTE — PLAN OF CARE
Problem: Potential for Falls  Goal: Patient will remain free of falls  INTERVENTIONS:  - Assess patient frequently for physical needs  - Identify cognitive and physical deficits and behaviors that affect risk of falls    - [place] fall precautions as indicated by assessment   - Educate patient/family on patient safety including physical limitations  - Instruct patient to call for assistance with activity based on assessment  - Modify environment to reduce risk of injury  - Consider OT/PT consult to assist with strengthening/mobility   Outcome: Progressing

## 2017-05-31 PROBLEM — R06.02 SOB (SHORTNESS OF BREATH): Status: ACTIVE | Noted: 2017-01-01

## 2018-01-10 NOTE — RESULT NOTES
Verified Results  (1) COMPREHENSIVE METABOLIC PANEL [id number] 27:25VQ Jerri Shankar   TW Order Number: [id number]      National Kidney Disease Education Program recommendations are as follows:  GFR calculation is accurate only with a steady state creatinine  Chronic Kidney disease less than 60 ml/min/1 73 sq  meters  Kidney failure less than 15 ml/min/1 73 sq  meters  Test Name Result Flag Reference   GLUCOSE,RANDM 104 mg/dL     SODIUM 141 mmol/L  136-145   POTASSIUM 4 2 mmol/L  3 5-5 3   CHLORIDE 105 mmol/L  100-108   CARBON DIOXIDE 26 mmol/L  21-32   ANION GAP (CALC) 10 mmol/L  4-13   BLOOD UREA NITROGEN 10 mg/dL  5-25   CREATININE 0 99 mg/dL  0 60-1 30   Standardized to IDMS reference method   CALCIUM 8 3 mg/dL  8 3-10 1   BILI, TOTAL 0 76 mg/dL  0 20-1 00   ALK PHOSPHATAS 71 U/L     ALT (SGPT) 38 U/L  12-78   AST(SGOT) 33 U/L  5-45   ALBUMIN 3 4 g/dL L 3 5-5 0   TOTAL PROTEIN 6 8 g/dL  6 4-8 2   eGFR Non-African American      >60 0 ml/min/1 73sq m     (1) LIPID PANEL FASTING W DIRECT LDL REFLEX 01Apr2016 07:47AM Jerri Shankar   TW Order Number: [id number]      Triglyceride:         Normal              <150 mg/dl       Borderline High    150-199 mg/dl       High               200-499 mg/dl       Very High          >499 mg/dl  Cholesterol:         Desirable        <200 mg/dl      Borderline High  200-239 mg/dl      High             >239 mg/dl  HDL Cholesterol:        High    >59 mg/dL      Low     <41 mg/dL  LDL Cholesterol:        Optimal          <100 mg/dl         Near Optimal     100-129 mg/dl        Above Optimal          Borderline High   130-159 mg/dl          High              160-189 mg/dl          Very High        >189 mg/dl  LDL CALCULATED:    This screening LDL is a calculated result  It does not have the accuracy of the Direct Measured LDL in the monitoring of patients with hyperlipidemia and/or statin therapy     Direct Measure LDL (XLM275) must be ordered separately in these patients  Test Name Result Flag Reference   CHOLESTEROL 248 mg/dL H    LDL CHOLESTEROL CALCULATED 148 mg/dL H 0-100   TRIGLYCERIDES 267 mg/dL H <=150   HDL,DIRECT 47 mg/dL  40-60     (1) TSH WITH FT4 REFLEX 01Apr2016 07:47AM Gwyn Yost   TW Order Number: [id number]    Patients undergoing fluorescein dye angiography may retain small amounts of fluorescein in the body for 48-72 hours post procedure  Samples containing fluorescein can produce falsely depressed TSH values  If the patient had this procedure,a specimen should be resubmitted post fluorescein clearance       Test Name Result Flag Reference   TSH 1 510 uIU/mL  0 358-3 740     (1) CBC/PLT/DIFF 01Apr2016 07:47AM Gwyn Yost   TW Order Number: [id number]    TW Order Number: [id number]     Test Name Result Flag Reference   WBC COUNT 3 93 Thousand/uL L 4 31-10 16   RBC COUNT 4 57 Million/uL  3 88-5 62   HEMOGLOBIN 14 7 g/dL  12 0-17 0   HEMATOCRIT 44 1 %  36 5-49 3   MCV 97 fL  82-98   MCH 32 2 pg  26 8-34 3   MCHC 33 3 g/dL  31 4-37 4   RDW 14 1 %  11 6-15 1   MPV 10 6 fL  8 9-12 7   PLATELET COUNT 176 Thousands/uL  149-390   nRBC AUTOMATED 0 /100 WBCs     NEUTROPHILS RELATIVE PERCENT 33 % L 43-75   LYMPHOCYTES RELATIVE PERCENT 54 % H 14-44   MONOCYTES RELATIVE PERCENT 8 %  4-12   EOSINOPHILS RELATIVE PERCENT 5 %  0-6   BASOPHILS RELATIVE PERCENT 0 %  0-1   NEUTROPHILS ABSOLUTE COUNT 1 29 Thousands/µL L 1 85-7 62   LYMPHOCYTES ABSOLUTE COUNT 2 12 Thousands/µL  0 60-4 47   MONOCYTES ABSOLUTE COUNT 0 31 Thousand/µL  0 17-1 22   EOSINOPHILS ABSOLUTE COUNT 0 20 Thousand/µL  0 00-0 61   BASOPHILS ABSOLUTE COUNT 0 01 Thousands/µL  0 00-0 10     (1) VITAMIN B12 01Apr2016 07:47AM Gwyn Yost   TW Order Number: [id number]     Test Name Result Flag Reference   VITAMIN B12 624 pg/mL  100-900     (1) VITAMIN D 25-HYDROXY 01Apr2016 07:47AM Gwyn Yost   TW Order Number: [id number]    TW Order Number: [id number]     Test Name Result Flag Reference VIT D 25-HYDROX 37 1 ng/mL  30 0-100 0     (1) PSA (SCREEN) (Dx V76 44 Screen for Prostate Cancer) 01Apr2016 07:47AM Page Green   TW Order Number: [id number]    TW Order Number: [id number]     Test Name Result Flag Reference   PROSTATE SPECIFIC ANTIGEN 0 2 ng/mL  0 0-4 0       Discussion/Summary   OVERALL LABS ARE GOOD  WILL DISCUSS AT NEXT VISIT     Amaury Keep

## 2018-01-11 NOTE — PROGRESS NOTES
History of Present Illness  [employer] Revaccination:   Revaccination   Vaccine Information: Vaccine(s) Given (names): Debra Linares [id number]  Action(s): Appointment scheduled: [id number]  Other Information: Revaccination  Revaccination Completed: [id number]  Active Problems    1  Abnormal ECG (794 31) (R94 31)   2  Allergic rhinitis due to pollen (477 0) (J30 1)   3  Anxiety disorder (300 00) (F41 9)   4  Arthralgia (719 40) (M25 50)   5  Asthma (493 90) (J45 909)   6  Atherosclerosis of coronary artery of native heart with unstable angina pectoris   (414 01,411 1) (I25 110)   7  Benign essential hypertension (401 1) (I10)   8  Blurring of vision (368 8) (H53 8)   9  BPH (benign prostatic hypertrophy) (600 00) (N40 0)   10  Cancer-related pain (338 3) (G89 3)   11  Cataract (366 9) (H26 9)   12  Chemotherapy-induced neuropathy (357 6,E933 1) (G62 0,T45  1X5A)   13  Dehydration (276 51) (E86 0)   14  Dyslipidemia (272 4) (E78 5)   15  EKG abnormalities (794 31) (R94 31)   16  Encounter for screening colonoscopy (V76 51) (Z12 11)   17  Enlarged prostate without lower urinary tract symptoms (luts) (600 00) (N40 0)   18  Fatigue (780 79) (R53 83)   19  Fuchs' corneal dystrophy (371 57) (H18 51)   20  GERD without esophagitis (530 81) (K21 9)   21  Hearing loss (389 9) (H91 90)   22  Hospital discharge follow-up (V67 59) (Z09)   23  Infectious colitis (009 0) (A09)   24  Insomnia (780 52) (G47 00)   25  Malignant neoplasm metastatic to lung (197 0) (C78 00)   26  Nausea (787 02) (R11 0)   27  Need for diphtheria-tetanus-pertussis (Tdap) vaccine (V06 1) (Z23)   28  Need for pneumococcal vaccination (V03 82) (Z23)   29  Need for prophylactic vaccination and inoculation against influenza (V04 81) (Z23)   30  Need for revaccination (V05 9) (Z23)   31  Other biomechanical lesion of abdomen or other region (738 8) (M99 89)   32  Pancreatic duct dilated (577 8) (K86 89)   33  Pleural effusion (511 9) (J90)   34   Preoperative cardiovascular examination (V72 81) (Z01 810)   35  Preoperative clearance (V72 84) (Z01 818)   36  Primary localized osteoarthrosis of left shoulder (715 11) (M19 012)   37  Prostate cancer screening (V76 44) (Z12 5)   38  Renal cell adenoma of left kidney (223 0) (D30 02)   39  Renal cell carcinoma, unspecified laterality (189 0) (C64 9)   40  Shoulder pain (719 41) (M25 519)   41  Sinus bradycardia (427 89) (R00 1)   42  Subdeltoid bursitis of right shoulder joint (726 19) (M75 51)   43  Under care of palliative care specialist (V66 7) (Z51 5)   44  Vitamin D deficiency (268 9) (E55 9)    Current Meds   1  Advair Diskus 500-50 MCG/DOSE Inhalation Aerosol Powder Breath Activated; INHALE   1 PUFF TWICE DAILY; Therapy: 40Lwb0509 to (Grabiel Alexander)  Requested for: 21MRD6396; Last   Rx:95Cbb5046 Ordered   2  Cetirizine HCl - 10 MG Oral Tablet; TAKE 1 TABLET DAILY AS DIRECTED; Therapy: 98XPD0053 to (Evaluate:23Oct2016)  Requested for: 27Jaa8791; Last   Rx:65Rkr1526 Ordered   3  Ciprofloxacin HCl - 500 MG Oral Tablet; take 1 tablet every twelve hours; Therapy: 72RWV6229 to (Grabiel Alexander)  Requested for: 05LSB7850; Last   Rx:17Lhx8068 Ordered   4  Citalopram Hydrobromide 20 MG Oral Tablet; TAKE 1 TABLET DAILY; Therapy: 50Tqx4109 to (Evaluate:63Ncj0260)  Requested for: 14Hdo5473; Last   Rx:89Gwd7565 Ordered   5  Finasteride 5 MG Oral Tablet; TAKE 1 TABLET DAILY; Therapy: (Recorded:12Jun2014) to  Requested for: 12Jun2014 Recorded   6  Fluticasone Propionate 50 MCG/ACT Nasal Suspension; instill 2 sprays into each nostril   once daily; Therapy: 03BTS6521 to (Evaluate:44Abs3340)  Requested for: 15Zri2989; Last   Rx:38Lao2529 Ordered   7  LORazepam 1 MG Oral Tablet; take one and one half pills bid prn anxiety; Therapy: 65KPC3691 to (Calderon Kam)  Requested for: 21WPM6940; Last   Rx:71Gcr1067 Ordered   8  Metoprolol Tartrate 25 MG Oral Tablet; take 1/2 tablet twice a day;    Therapy: 53XLK3066 to (Evaluate:16Jun2017)  Requested for: 98QMB3438; Last   Rx:95Rvv3309 Ordered   9  MetroNIDAZOLE 500 MG Oral Tablet; TAKE 1 TABLET Every 8 hours; Therapy: 11GTB0048 to (Dorrine Closs)  Requested for: 17QHY6923; Last   Rx:02Nov2016 Ordered   10  Omeprazole 20 MG Oral Capsule Delayed Release; take 1 capsule by mouth twice a    day; Therapy: 53RZW7830 to (Evaluate:22Nov2017)  Requested for: 75CBK5155; Last    Rx:04Pqc3069 Ordered   11  Oxybutynin Chloride ER 10 MG Oral Tablet Extended Release 24 Hour; Take 1 tablet    daily; Therapy: 21DXJ8662 to (Evaluate:04Jun2017)  Requested for: 39Esm3867; Last    DL:44PLV9803 Ordered   12  OxyCODONE HCl - 5 MG Oral Tablet; Take 1-2 tablets every 4 hrs as needed for pain; Therapy: 03GMH9009 to (Evaluate:76Lcg2232); Last Rx:07Nov2016 Ordered   13  Potassium Chloride Ximena ER 20 MEQ Oral Tablet Extended Release; take 1 tablet by    mouth once daily; Therapy: 77MQF8440 to (Evaluate:07Jun2017)  Requested for: 35GMX3725; Last    Rx:31Kmq9932 Ordered   14  Pravastatin Sodium 10 MG Oral Tablet; Take 1 tablet by mouth at bedtime; Therapy: 99Aee6013 to (Evaluate:14Apr2017)  Requested for: 60MCS4935; Last    Rx:90Mrx4661 Ordered   15  ProAir  (90 Base) MCG/ACT Inhalation Aerosol Solution; INHALE 1-2 PUFFS    EVERY 4-6 HOURS AS NEEDED AND AS DIRECTED; Therapy: 13KII0684 to (Dorrine Closs)  Requested for: 35YAI6944; Last    Rx:99Nco2587 Ordered   16  Tamsulosin HCl - 0 4 MG Oral Capsule; TAKE 1 CAPSULE Bedtime; Therapy: 70YDH5040 to (Evaluate:08Nov2017)  Requested for: 24NCJ2503; Last    Rx:13Nov2016 Ordered   17  TraZODone HCl - 50 MG Oral Tablet; TAKE 1 AND 1/2 TABLETS AT BEDTIME     Requested for: 17UHP1162; Last Rx:25Mar2015 Ordered   18  Valsartan-Hydrochlorothiazide 320-25 MG Oral Tablet; Take 1 tablet daily; Therapy: 21Jan2016 to (Lexie Cummins)  Requested for: 22Uhm7667; Last    Rx:02Vfq2922 Ordered   19   Votrient 200 MG Oral Tablet; 4 tabs po daily; Therapy: 58PWY3416 to (Last Rx:25Mar2016) Ordered    Allergies    1  Cymbalta CPEP   2  Effexor   3  Morphine Derivatives    4  No Known Environmental Allergies   5  No Known Food Allergies    Plan  Need for revaccination    · RVAC-Adacel 5-2-15 5 LF-MCG/0 5 Intramuscular Suspension    Future Appointments    Date/Time Provider Specialty Site   01/06/2017 08:45 AM TOSHIA Jones  Hematology Oncology [facility]   01/13/2017 03:00 PM TOSHIA Donnelly   Cardiology [facility] CARDIOLOGY [place]   02/06/2017 09:00 AM Nita Elder, 1 [facility] PALLIATIVE CARE     Signatures   Electronically signed by : Nellie Beck DO; Dec 21 2016 12:17PM EST                       (Author)
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
## 2018-01-11 NOTE — PROCEDURES
Procedures by Dolores Dallas PA-C at 4/11/2017   9:48 AM      Author:  Dolores Dallas PA-C Service:  Interventional Radiology  Author Type:  Physician Assistant    Filed:  4/11/2017  9:53 AM Date of Service:  4/11/2017  9:48 AM Status:  Attested    :  Dolores Dallas PA-C (Physician Assistant)  Cosigner:  Gilmer Cueva MD at 4/11/2017  6:39 PM      Procedure Orders:       1  Thoracentesis [[id number]] ordered by Dolores Dallas PA-C at 04/11/17 3502                 Post-procedure Diagnoses:       1  Pleural effusion, malignant [J91 0]              Attestation signed by Gilmer Cueva MD at 4/11/2017  6:39 PM           I have reviewed the notes, assessments, and/or procedures performed by JOHN Middleton , I concur with her/his documentation of Veronica Joyner                                                Thoracentesis  Date/Time: 4/11/2017 9:48 AM  Performed by: Yanelis Hartley  Authorized by: Jenn Mishra     Patient location: Interventional Radiology  Consent:     Consent obtained:  Written    Consent given by:  Patient    Risks discussed:  Bleeding, incomplete drainage, infection, pain and pneumothorax    Alternatives discussed:  Delayed treatment, alternative treatment and no treatment  Universal protocol:     Procedure explained and questions answered to patient or proxy's satisfaction: yes      Relevant documents present and verified: yes      Test results available and properly labeled: yes       Imaging studies available: yes      Required blood products, implants, devices and special equipment available: yes      Site/side marked: yes      Immediately prior to procedure a time out was called: yes      Patient identity confirmed:  Verbally with patient and arm band  Indications:     Procedure Purpose: therapeutic      Indications comment:  Recurrent left malignant pleural effusion  Anesthesia (see MAR for exact dosages):      Anesthesia method:  Local infiltration    Local anesthetic:  Lidocaine 1% w/o epi  Procedure details:     Preparation: Patient was prepped and draped in usual sterile fashion      Standard thoracentesis cath kit used: Yes      Patient position:  Sitting    Laterality:  Unilateral and left    Location:  Midscapular line    Puncture method:  Over-the-needle catheter    Guidance: ultrasound      Reason for ultrasound: Identify fluid collection and guide cathetar placement  Images stored locally on hard drive      Indwelling  catheter placed: no      Number of attempts:  1    Drainage color:  Savita (dark)    Drainage characteristics:  Cloudy    Fluid removed amount:  1,500 mL  Post-procedure details:     Patient tolerance of procedure: Tolerated well, no immediate complications  Comments:      --------------------            04/11/17               0942     --------------------   BP:       125/78     Pulse:      63       Resp:       20       SpO2:      96%      --------------------    No labs on pleural fluid were ordered today  Therapeutic left-sided thoracentesis was performed                      Received for:Caitlin Middleton [facility]  Apr 11 2017  6:39PM [facility] Standard Time

## 2018-01-12 NOTE — MISCELLANEOUS
Message  Per Jenny's call I called GI lab to set Mr Jhoana Harris up for May 4th in the pm for Dr Felisa Parmar to place an Asept cath  I spoke to Emily Manzano who took the infor said she will book romm#5 and call me back with a time  Active Problems    1  Abnormal ECG (794 31) (R94 31)   2  Acute maxillary sinusitis, recurrence not specified (461 0) (J01 00)   3  Adhesive capsulitis of right shoulder (726 0) (M75 01)   4  Allergic rhinitis due to pollen (477 0) (J30 1)   5  Anxiety disorder (300 00) (F41 9)   6  Arthralgia (719 40) (M25 50)   7  Asthma (493 90) (J45 909)   8  Atherosclerosis of coronary artery of native heart with unstable angina pectoris   (414 01,411 1) (I25 110)   9  Benign essential hypertension (401 1) (I10)   10  BPH (benign prostatic hypertrophy) (600 00) (N40 0)   11  Cancer-related pain (338 3) (G89 3)   12  Cataract (366 9) (H26 9)   13  Chemotherapy-induced neuropathy (357 6,E933 1) (G62 0,T45  1X5A)   14  Dyslipidemia (272 4) (E78 5)   15  EKG abnormalities (794 31) (R94 31)   16  Encounter for screening colonoscopy (V76 51) (Z12 11)   17  Enlarged prostate without lower urinary tract symptoms (luts) (600 00) (N40 0)   18  Fatigue (780 79) (R53 83)   19  Fuchs' corneal dystrophy (371 57) (H18 51)   20  GERD without esophagitis (530 81) (K21 9)   21  Hearing loss (389 9) (H91 90)   22  Insomnia (780 52) (G47 00)   23  Malignant neoplasm metastatic to lung (197 0) (C78 00)   24  Nausea (787 02) (R11 0)   25  Need for diphtheria-tetanus-pertussis (Tdap) vaccine (V06 1) (Z23)   26  Need for pneumococcal vaccination (V03 82) (Z23)   27  Need for prophylactic vaccination and inoculation against influenza (V04 81) (Z23)   28  Need for revaccination (V05 9) (Z23)   29  Other biomechanical lesion of abdomen or other region (738 8) (M99 89)   30  Pancreatic duct dilated (577 8) (K86 89)   31  Pleural effusion (511 9) (J90)   32  Preoperative clearance (V72 84) (Z01 818)   33   Primary localized osteoarthrosis of left shoulder (715 11) (M19 012)   34  Prostate cancer screening (V76 44) (Z12 5)   35  Renal cell adenoma of left kidney (223 0) (D30 02)   36  Renal cell carcinoma, unspecified laterality (189 0) (C64 9)   37  Shoulder pain (719 41) (M25 519)   38  Shoulder pain, right (719 41) (M25 511)   39  Sinus bradycardia (427 89) (R00 1)   40  Subdeltoid bursitis of right shoulder joint (726 19) (M75 51)   41  Under care of palliative care specialist (V66 7) (Z51 5)   42  Vitamin D deficiency (268 9) (E55 9)    Current Meds   1  Advair Diskus 500-50 MCG/DOSE Inhalation Aerosol Powder Breath Activated; inhale 1   dose by mouth twice a day; Therapy: 07Ugc0842 to (Evaluate:95Iml4421)  Requested for: 05Apr2017; Last   Rx:48Kmc2264 Ordered   2  Cetirizine HCl - 10 MG Oral Tablet; TAKE 1 TABLET DAILY AS DIRECTED; Therapy: 42PBT4953 to (Evaluate:30Qny9944)  Requested for: 26Apr2016; Last   Rx:54Nyl6554 Ordered   3  Citalopram Hydrobromide 20 MG Oral Tablet; TAKE 1 TABLET DAILY; Therapy: 06Hdd7088 to (Evaluate:48Lup6007)  Requested for: 34Qji3917; Last   Rx:52Wsv5447 Ordered   4  Finasteride 5 MG Oral Tablet; TAKE 1 TABLET DAILY; Therapy: (Recorded:08Tta1760) to  Requested for: 12Jun2014 Recorded   5  Fluticasone Propionate 50 MCG/ACT Nasal Suspension; instill 2 sprays into each nostril   once daily; Therapy: 50HDC2670 to (Evaluate:07Xsb4717)  Requested for: 34Frp5758; Last   Rx:10Iuc0033 Ordered   6  LevoFLOXacin 500 MG Oral Tablet; TAKE 1 TABLET DAILY AS DIRECTED; Therapy: 46Hpt9697 to (Evaluate:96Sbr1384)  Requested for: 65Uob3081; Last   Rx:94Ipz7642 Ordered   7  LORazepam 1 MG Oral Tablet; TAKE 1 5 TABLET Twice daily PRN anxiety; Therapy: 06RUW9978 to (Evaluate:48Nsy5436)  Requested for: 63Mxg6887; Status:   ACTIVE - Renewal Denied Recorded   8  Metoprolol Tartrate 25 MG Oral Tablet; take 1/2 tablet twice a day;    Therapy: 82PFZ2373 to (Evaluate:16Jun2017)  Requested for: 16CPL5322; Last Rx:45Isi9341 Ordered   9  OLANZapine 5 MG Oral Tablet; TAKE 1 TAB Q HS  AFTER 1 WEEK, ADD 0 5 TAB   (2 5MG) Q AM AND CONTINUE 1 TAB Q HS; Therapy: 99WCV0246 to (Alfreda Clemonss)  Requested for: 10ZLS6375; Last   Rx:07Mar2017 Ordered   10  Omeprazole 20 MG Oral Capsule Delayed Release; take 1 capsule by mouth twice a    day; Therapy: 76TLD9049 to (Evaluate:22Nov2017)  Requested for: 43VCG1025; Last    Rx:27Nov2016 Ordered   11  Ondansetron HCl - 8 MG Oral Tablet; Take one tablet by mouth every 8 hours as needed    for nausea; Therapy: 45IAU2163 to (Alfreda Mara)  Requested for: 07LBM9430; Last    Rx:06Mar2017 Ordered   12  Oxybutynin Chloride ER 10 MG Oral Tablet Extended Release 24 Hour; Take 1 tablet    daily; Therapy: 22OCQ4251 to (Evaluate:04Jun2017)  Requested for: 80Yin5154; Last    ZP:16DQL8834 Ordered   13  OxyCODONE HCl - 5 MG Oral Tablet; Take 1-2 tablets every 3 hrs as needed for     CANCER pain  Mod-severe; Therapy: 63FAK7983 to (Evaluate:08Apr2017) Recorded   14  Potassium Chloride Ximena ER 20 MEQ Oral Tablet Extended Release; take 1 tablet by    mouth once daily; Therapy: 34SXC3034 to (Evaluate:07Jun2017)  Requested for: 79XSD0881; Last    Rx:47Wfs7026 Ordered   15  Pravastatin Sodium 10 MG Oral Tablet; Take 1 tablet by mouth at bedtime; Therapy: 22Apr2016 to (Evaluate:14Apr2017)  Requested for: 64GSE0423; Last    Rx:00Nyp2551 Ordered   16  ProAir  (90 Base) MCG/ACT Inhalation Aerosol Solution; INHALE 1-2 PUFFS    EVERY 4-6 HOURS AS NEEDED AND AS DIRECTED; Therapy: 69CEN7342 to (Racheal Hogue)  Requested for: 04FIB1040; Last    Rx:08Tnn4690 Ordered   17  Tamsulosin HCl - 0 4 MG Oral Capsule; TAKE 1 CAPSULE Bedtime; Therapy: 75RHV5321 to (Racheal Hogue)  Requested for: 67LTD4881; Last    Rx:13Nov2016 Ordered   18  TraZODone HCl - 50 MG Oral Tablet; TAKE 1 AND 1/2 TABLETS AT BEDTIME     Requested for: 56NIB3463; Last Rx:25Mar2015 Ordered   19   Valsartan 160 MG Oral Tablet; take 1 tablet by mouth every day; Therapy: 54ZUY4800 to (Evaluate:27Twi3010)  Requested for: 60SAY6804; Last    Rx:13Jan2017 Ordered   20  Valsartan-Hydrochlorothiazide 320-25 MG Oral Tablet; take 1 tablet by mouth once    daily; Therapy: 21Jan2016 to (Evaluate:18Xmp7161)  Requested for: 69OIV2866; Last    Rx:96Xvr9231 Ordered   21  Votrient 200 MG Oral Tablet; 4 tabs po daily; Therapy: 48WUB3593 to (Last Rx:22Dvg1394)  Requested for: 94Ezj4541 Ordered    Allergies    1  Cymbalta CPEP   2  Effexor   3  Morphine Derivatives    4  No Known Environmental Allergies   5   No Known Food Allergies    Signatures   Electronically signed by : Jonn Mora, ; Apr 26 2017  3:03PM EST                       (Author)

## 2018-01-13 VITALS
OXYGEN SATURATION: 98 % | TEMPERATURE: 95.5 F | HEART RATE: 73 BPM | RESPIRATION RATE: 16 BRPM | WEIGHT: 164.38 LBS | BODY MASS INDEX: 24.35 KG/M2 | DIASTOLIC BLOOD PRESSURE: 70 MMHG | HEIGHT: 69 IN | SYSTOLIC BLOOD PRESSURE: 130 MMHG

## 2018-01-13 VITALS
DIASTOLIC BLOOD PRESSURE: 60 MMHG | HEART RATE: 94 BPM | RESPIRATION RATE: 16 BRPM | HEIGHT: 71 IN | SYSTOLIC BLOOD PRESSURE: 102 MMHG | WEIGHT: 144.38 LBS | TEMPERATURE: 97 F | OXYGEN SATURATION: 95 % | BODY MASS INDEX: 20.21 KG/M2

## 2018-01-13 VITALS
TEMPERATURE: 96.7 F | OXYGEN SATURATION: 91 % | DIASTOLIC BLOOD PRESSURE: 54 MMHG | HEIGHT: 69 IN | RESPIRATION RATE: 16 BRPM | WEIGHT: 163 LBS | HEART RATE: 91 BPM | BODY MASS INDEX: 24.14 KG/M2 | SYSTOLIC BLOOD PRESSURE: 98 MMHG

## 2018-01-13 VITALS
HEART RATE: 67 BPM | SYSTOLIC BLOOD PRESSURE: 148 MMHG | DIASTOLIC BLOOD PRESSURE: 89 MMHG | BODY MASS INDEX: 23.46 KG/M2 | WEIGHT: 163.5 LBS

## 2018-01-13 VITALS
SYSTOLIC BLOOD PRESSURE: 118 MMHG | HEIGHT: 69 IN | RESPIRATION RATE: 14 BRPM | DIASTOLIC BLOOD PRESSURE: 86 MMHG | OXYGEN SATURATION: 95 % | BODY MASS INDEX: 24.04 KG/M2 | TEMPERATURE: 98.8 F | WEIGHT: 162.31 LBS | HEART RATE: 51 BPM

## 2018-01-14 VITALS
DIASTOLIC BLOOD PRESSURE: 81 MMHG | SYSTOLIC BLOOD PRESSURE: 118 MMHG | BODY MASS INDEX: 24.42 KG/M2 | HEART RATE: 67 BPM | WEIGHT: 165.38 LBS

## 2018-01-14 VITALS
TEMPERATURE: 97.1 F | HEART RATE: 80 BPM | RESPIRATION RATE: 16 BRPM | DIASTOLIC BLOOD PRESSURE: 82 MMHG | SYSTOLIC BLOOD PRESSURE: 132 MMHG | WEIGHT: 169.5 LBS | BODY MASS INDEX: 23.64 KG/M2

## 2018-01-14 VITALS
WEIGHT: 160 LBS | DIASTOLIC BLOOD PRESSURE: 62 MMHG | TEMPERATURE: 98.4 F | BODY MASS INDEX: 23.63 KG/M2 | HEART RATE: 94 BPM | RESPIRATION RATE: 16 BRPM | OXYGEN SATURATION: 93 % | SYSTOLIC BLOOD PRESSURE: 84 MMHG

## 2018-01-14 NOTE — PROGRESS NOTES
Chief Complaint  Patient presents to office for a blood pressure check  Active Problems    1  Anxiety disorder (300 00) (F41 9)   2  Arthralgia (719 40) (M25 50)   3  Asthma (493 90) (J45 909)   4  Benign essential hypertension (401 1) (I10)   5  BPH (benign prostatic hypertrophy) (600 00) (N40 0)   6  Cancer-related pain (338 3) (G89 3)   7  Chemotherapy-induced neuropathy (357 6,E933 1) (G62 0,T45  1X5A)   8  Dyslipidemia (272 4) (E78 5)   9  Encounter for screening colonoscopy (V76 51) (Z12 11)   10  Enlarged prostate without lower urinary tract symptoms (luts) (600 00) (N40 0)   11  Fatigue (780 79) (R53 83)   12  GERD without esophagitis (530 81) (K21 9)   13  Hearing loss (389 9) (H91 90)   14  Hospital discharge follow-up (V67 59) (Z09)   15  Insomnia (780 52) (G47 00)   16  Malignant neoplasm metastatic to lung (197 0) (C78 00)   17  Nausea (787 02) (R11 0)   18  Need for pneumococcal vaccination (V03 82) (Z23)   19  Need for prophylactic vaccination and inoculation against influenza (V04 81) (Z23)   20  Other biomechanical lesion of abdomen or other region (738 8) (M99 89)   21  Pancreatic duct dilated (577 8) (K86 8)   22  Pleural effusion (511 9) (J90)   23  Primary localized osteoarthrosis of left shoulder (715 11) (M19 012)   24  Renal cell carcinoma, unspecified laterality (189 0) (C64 9)   25  Shoulder pain (719 41) (M25 519)   26  Under care of palliative care specialist (V66 7) (Z51 5)   27  Vitamin D deficiency (268 9) (E55 9)    Current Meds   1  Advair Diskus 500-50 MCG/DOSE Inhalation Aerosol Powder Breath Activated; INHALE   1 PUFF TWICE DAILY; Therapy: 43Khq5196 to (Olu Tomlinson)  Requested for: 53VFI6804; Last   Rx:84Xrl5305 Ordered   2  Citalopram Hydrobromide 20 MG Oral Tablet; TAKE 1 TABLET DAILY; Therapy: 28Qng2553 to (Evaluate:11Vkz3870)  Requested for: 31Mvv9315; Last   Rx:25Dwj3500 Ordered   3  Finasteride 5 MG Oral Tablet; TAKE 1 TABLET DAILY;    Therapy: (Recorded:12Jun2014) to  Requested for: 12Jun2014 Recorded   4  Fluticasone Propionate 50 MCG/ACT Nasal Suspension; USE 2 SPRAYS IN EACH   NOSTRIL ONCE DAILY; Therapy: 69JXC7834 to (Last BURGESS:70ESB5878)  Requested for: 26YZE2079 Ordered   5  Gabapentin 300 MG Oral Capsule; TAKE 1 CAPSULE 3 TIMES DAILY; Therapy: 45Dhp7297 to (Evaluate:22Nov2017); Last Rx:07Wsl2334 Ordered   6  LORazepam 1 MG Oral Tablet; take one and one half pills bid prn anxiety; Therapy: 57DZN3387 to (Evaluate:48Ljb8872)  Requested for: 99FCJ0763; Last   Rx:95Ijn9944 Ordered   7  Methadone HCl - 5 MG Oral Tablet; Take 1/2 to 1 tablet daily at bedtime for pain; Therapy: 93EVM4074 to [phone]); Last Rx:99Qla7822 Ordered   8  Metoprolol Tartrate 25 MG Oral Tablet; take 1/2 tablet twice a day; Therapy: 89DZP6006 to (Evaluate:16May2016)  Requested for: 03BYE7897; Last   Rx:57Ala2789 Ordered   9  Omeprazole 20 MG Oral Capsule Delayed Release; take 1 capsule by mouth twice a   day; Therapy: 05UEB4616 to (Evaluate:10Aug2016)  Requested for: 07WRM8561; Last   Rx:65Bmd3318 Ordered   10  Oxybutynin Chloride ER 10 MG Oral Tablet Extended Release 24 Hour; Take 1 tablet    daily; Therapy: 04HYR3531 to (Evaluate:04Jun2017)  Requested for: 78Whe9596; Last    FW:48BIP5264 Ordered   11  OxyCODONE HCl - 5 MG Oral Tablet; TAKE 1-2 TABLET EVERY 4 HOURS AS NEEDED    FOR PAIN;    Therapy: 64Hfm0953 to (Evaluate:24Oct2017); Last Rx:21Pkf4752 Ordered   12  Potassium Chloride Ximena ER 20 MEQ Oral Tablet Extended Release; take 1 tablet by    mouth once daily; Therapy: 41FKF2507 to (Evaluate:36Ypa9844)  Requested for: 28Eoo4964; Last    Rx:57Jwd1113 Ordered   13  ProAir  (90 Base) MCG/ACT Inhalation Aerosol Solution; inhale 1 to 2 puffs every    4 to 6 hours if needed and as directed by prescriber; Therapy: 77RUD3050 to (Evaluate:01Ttp6098)  Requested for: 24NVW5058; Last    Rx:49Tut4780 Ordered   14   Prochlorperazine Maleate 10 MG Oral Tablet; take 1 tablet every 6 hours as needed for    nausea; Therapy: 03Iku2001 to (Evaluate:99Fir8902)  Requested for: 28LDT4322; Last    Rx:83Qgf7382 Ordered   15  Tamsulosin HCl - 0 4 MG Oral Capsule; take 1 capsule by mouth at bedtime; Therapy: 71JMD6793 to (Jenna Mina)  Requested for: 60VMQ9050; Last    Rx:10Nov2015; Status: ACTIVE - Renewal Denied Ordered   16  TraZODone HCl - 50 MG Oral Tablet; TAKE 1 AND 1/2 TABLETS AT BEDTIME     Requested for: 11JHB1568; Last Rx:25Mar2015 Ordered   17  Votrient 200 MG Oral Tablet; 4 tabs po daily; Therapy: 53NST6478 to (Last Rx:20Orr2347) Ordered    Allergies    1  Cymbalta CPEP   2  Effexor   3  Morphine Derivatives    4  No Known Environmental Allergies   5  No Known Food Allergies    Vitals  Signs [Data Includes: Current Encounter]    Systolic: 819  Diastolic: 262    Plan  Benign essential hypertension    · Valsartan-Hydrochlorothiazide 320-12 5 MG Oral Tablet; TAKE 1 TABLET ONCE  DAILY    Future Appointments    Date/Time Provider Specialty Site   01/27/2016 01:00 PM TOSHIA Grossman   Hematology Oncology [facility]   03/31/2016 12:30 PM Ching Ha DO Family Medicine [address] [facility]     Signatures   Electronically signed by : Rashad Guzman DO; Jan 21 2016 10:21AM EST                       (Author)

## 2018-01-15 NOTE — PROCEDURES
Procedures by Kiersten Moreira PA-C at  3/7/2017 12:09 PM      Author:  Kiersten Moreira PA-C Service:  Interventional Radiology  Author Type:  Physician Assistant    Filed:  3/7/2017 12:11 PM Date of Service:  3/7/2017 12:09 PM Status:  Attested    :  Kiersten Moreira PA-C (Physician Assistant)  Cosigner:  Royce Mayberry MD at 3/8/2017  8:53 AM      Procedure Orders:       1  Thoracentesis [[id number]] ordered by Kiersten Moreira PA-C at 03/07/17 1209               Attestation signed by Royce Mayberry MD at 3/8/2017  8:53 AM           I was present and participated in the key portions of this procedure and was immediately available  throughout  Thoracentesis  Date/Time: 3/7/2017 12:10 PM  Performed by: Dana Serrano by: Luis South     Patient location:  Bedside  Other Assisting Provider:  Yes (comment) (Dr Rebekah Fisher)    Consent:     Consent obtained:  Verbal and written    Consent given by:  Patient    Risks discussed:  Bleeding, incomplete drainage, infection, nerve damage, pain and pneumothorax    Alternatives discussed:  No treatment, delayed treatment, alternative treatment, observation and referral  Universal protocol:     Procedure explained and questions answered to patient or proxy's satisfaction: yes      Relevant documents present and verified: yes      Test results available and properly labeled: yes       Imaging studies available: yes      Required blood products, implants, devices and special equipment available: yes      Site/side marked: yes      Immediately prior to procedure a time out was called: yes      Patient identity confirmed:  Verbally with patient and arm band  Indications:     Procedure Purpose: diagnostic and therapeutic      Indications: pleural effusion    Anesthesia (see MAR for exact dosages):      Anesthesia method:  Local infiltration    Local anesthetic:  Lidocaine 1% w/o epi  Procedure details:     Preparation: Patient was prepped and draped in usual sterile fashion      Standard thoracentesis cath kit used: Yes      Patient position:  Sitting    Location:  Posterior    Puncture method:  Over-the-needle catheter    Guidance: ultrasound      Reason for ultrasound: Identify fluid collection and guide cathetar placement  Images stored locally on hard drive      Indwelling  catheter placed: no      Number of attempts:  2    Needle gauge: 5 Fr  Stockr  Catheter size: 5 Fr  Drainage color:  Savita    Drainage characteristics:  Cloudy    Fluid removed amount:  1,350  Post-procedure details:     Chest x-ray performed: no      Patient tolerance of procedure: Tolerated well, no immediate complications  Comments:      Procedure performed by Daniella Aparicio PA-C and Dr Mayra Cotto                    Received for:Sharon Hubbard [facility]  Mar  8 2017  8:53AM [facility] Standard Time

## 2018-01-15 NOTE — MISCELLANEOUS
Message   Recorded as Task   Date: 07/14/2016 02:23 PM, Created By: Judy Donaldson   Task Name: Call Back   Assigned To: Deborah Moore   Regarding Patient: Pj Thomas, Status: Complete   Comment:    MCOCQP,YFOQ - 14 Jul 2016 2:23 PM     TASK CREATED  PT called stating he is having excruciating abdominal pain [phone]   NateAdeline - 14 Jul 2016 3:03 PM     TASK EDITED  patient has increase abdominal pain two times / day when anxiety level increases, admits to a "good"appetite and normal bowel movements  will d/w Dr Pearl All and get back to patient   Adeline Sullivan - 14 Jul 2016 3:03 PM     TASK IN [address] - 14 Jul 2016 3:15 PM     TASK EDITED  per Dr Dave Mccurdy, please move up patient's scheduled CT scan from Sept ot ASAP    patient aware  will have  arrange and call patient with appt time   Adeline Sullivan - 14 Jul 2016 3:16 PM     TASK EDITED  please schedule CT   Deborah Moore - 14 Jul 2016 3:40 PM     TASK EDITED  PT [address] [facility] [facility] [address] 7/18/16 830PM SL BM   PT NOTIFIED  Deborah Moore - 14 Jul 2016 3:44 PM     TASK COMPLETED   Ever Sullivana - 15 Jul 2016 10:05 AM     TASK EDITED        Active Problems    1  Allergic rhinitis due to pollen (477 0) (J30 1)   2  Anxiety disorder (300 00) (F41 9)   3  Arthralgia (719 40) (M25 50)   4  Asthma (493 90) (J45 909)   5  Benign essential hypertension (401 1) (I10)   6  BPH (benign prostatic hypertrophy) (600 00) (N40 0)   7  Cancer-related pain (338 3) (G89 3)   8  Cataract (366 9) (H26 9)   9  Chemotherapy-induced neuropathy (357 6,E933 1) (G62 0,T45  1X5A)   10  Dehydration (276 51) (E86 0)   11  Dyslipidemia (272 4) (E78 5)   12  Encounter for screening colonoscopy (V76 51) (Z12 11)   13  Enlarged prostate without lower urinary tract symptoms (luts) (600 00) (N40 0)   14  Fatigue (780 79) (R53 83)   15  GERD without esophagitis (530 81) (K21 9)   16  Hearing loss (389 9) (H91 90)   17  Hospital discharge follow-up (V67 59) (Z09)   18   Insomnia (780 52) (G47 00)   19  Malignant neoplasm metastatic to lung (197 0) (C78 00)   20  Nausea (787 02) (R11 0)   21  Need for diphtheria-tetanus-pertussis (Tdap) vaccine (V06 1) (Z23)   22  Need for pneumococcal vaccination (V03 82) (Z23)   23  Need for prophylactic vaccination and inoculation against influenza (V04 81) (Z23)   24  Other biomechanical lesion of abdomen or other region (738 8) (M99 89)   25  Pancreatic duct dilated (577 8) (K86 8)   26  Pleural effusion (511 9) (J90)   27  Primary localized osteoarthrosis of left shoulder (715 11) (M19 012)   28  Prostate cancer screening (V76 44) (Z12 5)   29  Renal cell adenoma of left kidney (223 0) (D30 02)   30  Renal cell carcinoma, unspecified laterality (189 0) (C64 9)   31  Shoulder pain (719 41) (M25 519)   32  Sinus bradycardia (427 89) (R00 1)   33  Subdeltoid bursitis of right shoulder joint (726 19) (M75 51)   34  Under care of palliative care specialist (V66 7) (Z51 5)   35  Vitamin D deficiency (268 9) (E55 9)    Current Meds   1  Advair Diskus 500-50 MCG/DOSE Inhalation Aerosol Powder Breath Activated; INHALE   1 PUFF TWICE DAILY; Therapy: 09Ojs9478 to (Tad Part)  Requested for: 03VMO2892; Last   Rx:34Ecl0117 Ordered   2  Cetirizine HCl - 10 MG Oral Tablet; TAKE 1 TABLET DAILY AS DIRECTED; Therapy: 72QYX1126 to (Evaluate:23Oct2016)  Requested for: 02Iga2806; Last   Rx:57Xqt3997 Ordered   3  Citalopram Hydrobromide 20 MG Oral Tablet; TAKE 1 TABLET DAILY; Therapy: 47Uuh8108 to (Evaluate:15Qrb3212)  Requested for: 25Pib8706; Last   Rx:95Rfe9697 Ordered   4  Finasteride 5 MG Oral Tablet; TAKE 1 TABLET DAILY; Therapy: (Recorded:53Rjc2151) to  Requested for: 12Jun2014 Recorded   5  Fluticasone Propionate 50 MCG/ACT Nasal Suspension; instill 2 sprays into each nostril   once daily; Therapy: 20XWM2027 to (Evaluate:28Nxp8191)  Requested for: 38Mok6848; Last   Rx:07Bgu8636 Ordered   6   LORazepam 1 MG Oral Tablet; take one and one half pills bid prn anxiety; Therapy: 40EIW0939 to (Evaluate:71Kxs4356)  Requested for: 71BPS6240; Last   Rx:17Ipv4032 Ordered   7  Metoprolol Tartrate 25 MG Oral Tablet; take 1/2 tablet twice a day; Therapy: 15XDO4504 to (Evaluate:59Skd2192)  Requested for: 62SXG5220; Last   Rx:19Jun2016 Ordered   8  Omeprazole 20 MG Oral Capsule Delayed Release; take 1 capsule by mouth twice a   day; Therapy: 25ACH3631 to (Evaluate:10Aug2016)  Requested for: 20OXT9758; Last   Rx:19Mng0491 Ordered   9  Oxybutynin Chloride ER 10 MG Oral Tablet Extended Release 24 Hour; Take 1 tablet   daily; Therapy: 84MXT2455 to (Evaluate:04Jun2017)  Requested for: 52Zmg0373; Last   YO:18NHG7774 Ordered   10  OxyCODONE HCl - 5 MG Oral Tablet; TAKE 1-2 TABLET EVERY 4 HOURS AS NEEDED    FOR PAIN;    Therapy: 17Fbp1939 to (Evaluate:22May2016); Last Rx:77Exx7163 Ordered   11  Potassium Chloride Ximena ER 20 MEQ Oral Tablet Extended Release; take 1 tablet by    mouth once daily; Therapy: 86MQN8573 to (Evaluate:19Oct2016)  Requested for: 22Apr2016; Last    Rx:12Klo1069 Ordered   12  Pravastatin Sodium 10 MG Oral Tablet; TAKE 1 TABLET AT BEDTIME; Therapy: 01Jhx7471 to (Evaluate:19Oct2016)  Requested for: 22Apr2016; Last    Rx:15Uom4072 Ordered   13  ProAir  (90 Base) MCG/ACT Inhalation Aerosol Solution; inhale 1 to 2 puffs every    4 to 6 hours if needed and as directed by prescriber; Therapy: 08EKB6247 to (Evaluate:19Oct2017)  Requested for: 04CDI8802; Last    Rx:23Xmv1655 Ordered   14  Tamsulosin HCl - 0 4 MG Oral Capsule; take 1 capsule by mouth at bedtime; Therapy: 41FXP9586 to (Donzetta Delmer)  Requested for: 87ROR0216; Last    Rx:10Nov2015; Status: ACTIVE - Renewal Denied Ordered   15  TraZODone HCl - 50 MG Oral Tablet; TAKE 1 AND 1/2 TABLETS AT BEDTIME     Requested for: 55OYP5455; Last Rx:25Mar2015 Ordered   16  Valsartan-Hydrochlorothiazide 320-12 5 MG Oral Tablet; TAKE 1 TABLET ONCE DAILY;     Therapy: 21Jan2016 to (Evaluate:49Bud3834) Requested for: 21Jan2016; Last    YA:16DDS9534 Ordered   17  Votrient 200 MG Oral Tablet; 4 tabs po daily; Therapy: 77AWF6708 to (Last Rx:25Mar2016) Ordered    Allergies    1  Cymbalta CPEP   2  Effexor   3  Morphine Derivatives    4  No Known Environmental Allergies   5   No Known Food Allergies    Signatures   Electronically signed by : Bradford Bonilla, ; Jul 15 2016 10:06AM EST                       (Author)

## 2018-01-15 NOTE — MISCELLANEOUS
Assessment    1  Pleural effusion (511 9) (J90)   2  Malignant neoplasm metastatic to lung (197 0) (C78 00)   3  Renal cell adenoma of left kidney (223 0) (D30 02)   4  Fatigue (780 79) (R53 83)    Plan  Fatigue, Malignant neoplasm metastatic to lung, Pleural effusion, Renal cell adenoma of  left kidney    · Follow-up PRN Evaluation and Treatment  Follow-up  Status: Complete  Done:  91PXD6428   Ordered; For: Fatigue, Malignant neoplasm metastatic to lung, Pleural effusion, Renal cell adenoma of left kidney; Ordered By: Barabra Lorenzo Performed:  Due: 42LKH5361    Discussion/Summary  Discussion Summary:   Pushpa Guillen is stable at this time, but has been in poor health for months  He is going to try to be focusing more on himself in the near future  He has made plans  He is continuing f/u with Pulmonology and Heme/ONC  I have asked him to call us if he needs anything  Precautions were given  He is to f/u PRN (he is to keep any previously arranged f/u appts though)  Counseling Documentation With Imm: The patient was counseled regarding instructions for management, impressions, importance of compliance with treatment  Medication SE Review and Pt Understands Tx: The treatment plan was reviewed with the patient/guardian  The patient/guardian understands and agrees with the treatment plan      Chief Complaint  Chief Complaint Free Text Note Form: PT was in hospital 4/21/2017- 04/29/2017  PT was DX with FLU and had to get his lungs drained due to fluid build up  PT now has a permanent catheter in left lung  PT is still having difficulties getting his strength back  PT also is having SOB with pulse ox 94%      History of Present Illness  TCM Communication [facility]: The patient is being contacted for follow-up after hospitalization and 5/2/2017  Hospital records were reviewed and Pt was admitted with fever and LLL pleural effusion - 1 5L drained  Treat with IV antibiotics, steroids, and Tamiflu  Went back to the ER   He was hospitalized at [facility]  The date of admission: 4/21/2017, date of discharge: 4/22/2017  Diagnosis: flu  He was discharged to home  Medications reviewed and updated today  He scheduled a follow up appointment  Follow-up appointments with other specialists: Sees Pulmonology later today  He will be making a f/u appt with Heme/Onc as well  Symptoms: shortness of breath, but no fever, no cough, no chest pain, no anorexia and no constipation  Counseling was provided to the patient  Topics counseled included instructions for management and importance of compliance with treatment  Communication performed and completed by   HPI: As above  Spoke with Kody at [facility] today  He is hanging in there  With him getting sicker, he is worried about his wife and grandchildren who live with them  He does have his affairs in order, and clearly realizes how sick he is  We spoke also about his need to try to focus on himself more, possibly getting his family more involved with taking care of the kids - he is planning on trying to call them all together for a meeting  Active Problems    1  Abnormal ECG (794 31) (R94 31)   2  Acute maxillary sinusitis, recurrence not specified (461 0) (J01 00)   3  Adhesive capsulitis of right shoulder (726 0) (M75 01)   4  Allergic rhinitis due to pollen (477 0) (J30 1)   5  Anxiety disorder (300 00) (F41 9)   6  Arthralgia (719 40) (M25 50)   7  Asthma (493 90) (J45 909)   8  Atherosclerosis of coronary artery of native heart with unstable angina pectoris   (414 01,411 1) (I25 110)   9  Benign essential hypertension (401 1) (I10)   10  BPH (benign prostatic hypertrophy) (600 00) (N40 0)   11  Cancer-related pain (338 3) (G89 3)   12  Cataract (366 9) (H26 9)   13  Chemotherapy-induced neuropathy (357 6,E933 1) (G62 0,T45  1X5A)   14  Dyslipidemia (272 4) (E78 5)   15  EKG abnormalities (794 31) (R94 31)   16  Encounter for screening colonoscopy (V76 51) (Z12 11)   17   Enlarged prostate without lower urinary tract symptoms (luts) (600 00) (N40 0)   18  Fatigue (780 79) (R53 83)   19  Fuchs' corneal dystrophy (371 57) (H18 51)   20  GERD without esophagitis (530 81) (K21 9)   21  Hearing loss (389 9) (H91 90)   22  Insomnia (780 52) (G47 00)   23  Malignant neoplasm metastatic to lung (197 0) (C78 00)   24  Nausea (787 02) (R11 0)   25  Need for diphtheria-tetanus-pertussis (Tdap) vaccine (V06 1) (Z23)   26  Need for pneumococcal vaccination (V03 82) (Z23)   27  Need for prophylactic vaccination and inoculation against influenza (V04 81) (Z23)   28  Need for revaccination (V05 9) (Z23)   29  Other biomechanical lesion of abdomen or other region (738 8) (M99 89)   30  Pancreatic duct dilated (577 8) (K86 89)   31  Pleural effusion (511 9) (J90)   32  Preoperative clearance (V72 84) (Z01 818)   33  Primary localized osteoarthrosis of left shoulder (715 11) (M19 012)   34  Prostate cancer screening (V76 44) (Z12 5)   35  Renal cell adenoma of left kidney (223 0) (D30 02)   36  Renal cell carcinoma, unspecified laterality (189 0) (C64 9)   37  Shoulder pain (719 41) (M25 519)   38  Shoulder pain, right (719 41) (M25 511)   39  Sinus bradycardia (427 89) (R00 1)   40  Subdeltoid bursitis of right shoulder joint (726 19) (M75 51)   41  Under care of palliative care specialist (V66 7) (Z51 5)   42  Vitamin D deficiency (268 9) (E55 9)    Past Medical History    1  Acute bronchitis (466 0) (J20 9)   2  History of Acute bronchitis, unspecified organism (466 0) (J20 9)   3  History of Acute bronchitis, unspecified organism (466 0) (J20 9)   4  History of Acute frontal sinusitis, recurrence not specified (461 1) (J01 10)   5  History of Adhesive capsulitis of left shoulder (726 0) (M75 02)   6  History of Carpal tunnel syndrome, unspecified laterality (354 0) (G56 00)   7  History of Colon Obstruction (560 9)   8  History of blurred vision (V12 49) (Z86 69)   9  History of chest pain (V13 89) (Z87 898)   10  History of dehydration (V12 29) (Z86 39)   11  History of urinary tract infection (V13 02) (Z87 440)   12  History of Hospital discharge follow-up (V67 59) (Z09)   13  History of Hospital discharge follow-up (V67 59) (Z09)   14  History of Hospital discharge follow-up (V67 59) (Z09)   15  History of Infectious colitis (009 0) (A09)   16  Need for prophylactic vaccination and inoculation against influenza (V04 81) (Z23)   17  History of Post traumatic stress disorder (309 81) (F43 10)   18  History of Preoperative cardiovascular examination (V72 81) (Z01 810)   19  History of Preoperative clearance (V72 84) (Z01 818)   20  History of Unsteadiness on feet (781 2) (R26 81)   21  History of Well adult on routine health check (V70 0) (Z00 00)    Surgical History    1  History of Cataract Surgery   2  History of Diagnostic Cystoscopy   3  History of Hernia Repair   4  History of Nephrectomy   5  History of Surgery GI Reconst For Previous Esophagect W/ Colon Interpos    Family History  Mother    1  Family history of Stroke Syndrome (V17 1)  Father    2  Family history of Stroke Syndrome (V17 1)    Social History    · Denied: Alcohol Use (History)   · Current some day smoker (305 1) (F17 200)   · Denied: Drug Use (305 90)   · Former smoker (N64 71) (C03 640)   ·    · On disability    Current Meds   1  Advair Diskus 500-50 MCG/DOSE Inhalation Aerosol Powder Breath Activated; inhale 1   dose by mouth twice a day; Therapy: 44Ryh3133 to (Evaluate:17Qnq2231)  Requested for: 05Apr2017; Last   Rx:05Apr2017 Ordered   2  Cetirizine HCl - 10 MG Oral Tablet; TAKE 1 TABLET DAILY AS DIRECTED; Therapy: 01HPC1569 to (Evaluate:60Ibo9138)  Requested for: 08Nbm2983; Last   Rx:83Fhl2538 Ordered   3  Citalopram Hydrobromide 20 MG Oral Tablet; TAKE 1 TABLET DAILY; Therapy: 10Mqb5831 to (Evaluate:22Pcq5468)  Requested for: 95Zmc3724; Last   Rx:58Uiu5653 Ordered   4  Finasteride 5 MG Oral Tablet; TAKE 1 TABLET DAILY;    Therapy: (Recorded:12Jun2014) to  Requested for: 12Jun2014 Recorded   5  Fluticasone Propionate 50 MCG/ACT Nasal Suspension; instill 2 sprays into each nostril   once daily; Therapy: 33BVR9464 to (Evaluate:10Sep2016)  Requested for: 03Rsm1860; Last   Rx:60Fts7058 Ordered   6  LevoFLOXacin 500 MG Oral Tablet; TAKE 1 TABLET DAILY AS DIRECTED; Therapy: 34Skr3014 to (Evaluate:20Apr2017)  Requested for: 03Hbp8273; Last   Rx:10Apr2017 Ordered   7  LORazepam 1 MG Oral Tablet; TAKE 1 5 TABLET Twice daily PRN anxiety; Therapy: 42LMV0925 to (Evaluate:05Sep2017)  Requested for: 12Apr2017; Status:   ACTIVE - Renewal Denied Recorded   8  Metoprolol Tartrate 25 MG Oral Tablet; take 1/2 tablet twice a day; Therapy: 49DIY2537 to (Evaluate:16Jun2017)  Requested for: 03JBP9101; Last   Rx:64Yep5360 Ordered   9  OLANZapine 5 MG Oral Tablet; TAKE 1 TAB Q HS  AFTER 1 WEEK, ADD 0 5 TAB (2 5MG)   Q AM AND CONTINUE 1 TAB Q HS; Therapy: 25ZXD1051 to (Yordy Boggs)  Requested for: 91HTG2399; Last   Rx:07Mar2017 Ordered   10  Omeprazole 20 MG Oral Capsule Delayed Release; take 1 capsule by mouth twice a    day; Therapy: 15EPL9174 to (Evaluate:22Nov2017)  Requested for: 37JOJ8787; Last    Rx:27Nov2016 Ordered   11  Ondansetron HCl - 8 MG Oral Tablet; Take one tablet by mouth every 8 hours as needed    for nausea; Therapy: 08DXP9063 to (Yordy Boggs)  Requested for: 31MTL9434; Last    Rx:06Mar2017 Ordered   12  Oxybutynin Chloride ER 10 MG Oral Tablet Extended Release 24 Hour; Take 1 tablet    daily; Therapy: 02ZCE1419 to (Evaluate:04Jun2017)  Requested for: 51Fdm4293; Last    NF:06AXS0701 Ordered   13  OxyCODONE HCl - 5 MG Oral Tablet; Take 1-2 tablets every 3 hrs as needed for     CANCER pain  Mod-severe; Therapy: 72CYT1425 to (Evaluate:08Apr2017) Recorded   14  Potassium Chloride Ximena ER 20 MEQ Oral Tablet Extended Release; take 1 tablet by    mouth once daily;     Therapy: 50ZQK1048 to (Evaluate:07Jun2017)  Requested for: 29ZGH3075; Last    Rx:11Fsc6352 Ordered   15  Pravastatin Sodium 10 MG Oral Tablet; Take 1 tablet by mouth at bedtime; Therapy: 94Geh8653 to (Evaluate:13Oct2017)  Requested for: 33Uio9588; Last    Rx:20Syy1863 Ordered   16  ProAir  (90 Base) MCG/ACT Inhalation Aerosol Solution; INHALE 1-2 PUFFS    EVERY 4-6 HOURS AS NEEDED AND AS DIRECTED; Therapy: 55CBU1287 to (Calwa Henry)  Requested for: 15TXS7395; Last    Rx:71Xqx1242 Ordered   17  Tamsulosin HCl - 0 4 MG Oral Capsule; TAKE 1 CAPSULE Bedtime; Therapy: 06DBK1411 to (Maryanne Gambles)  Requested for: 67XHD0623; Last    Rx:94Lci8409 Ordered   18  TraZODone HCl - 50 MG Oral Tablet; TAKE 1 AND 1/2 TABLETS AT BEDTIME  Requested    for: 99LGB7418; Last Rx:44Mbq1455 Ordered   19  Valsartan 160 MG Oral Tablet; take 1 tablet by mouth every day; Therapy: 19VUB2718 to (Evaluate:49Dlb9603)  Requested for: 28VBI5408; Last    Rx:95Tal5122 Ordered   20  Valsartan-Hydrochlorothiazide 320-25 MG Oral Tablet; take 1 tablet by mouth once daily; Therapy: 21Jan2016 to (Evaluate:35Zey6079)  Requested for: 83NVQ0507; Last    Rx:78Wxj4921 Ordered   21  Votrient 200 MG Oral Tablet; 4 tabs po daily; Therapy: 55ICH5184 to (Last Rx:41Aul1621)  Requested for: 79Cmh2056 Ordered  Medication List Reviewed: The medication list was reviewed and updated today  Allergies    1  Cymbalta CPEP   2  Effexor   3  Morphine Derivatives    4  No Known Environmental Allergies   5  No Known Food Allergies    Vitals  Signs   Recorded: 37PDW7908 08:46AM   Temperature: 97 7 F  Heart Rate: 76  Respiration: 16  Systolic: 881  Diastolic: 90  Height: 5 ft 9 in  Weight: 170 lb 6 oz  BMI Calculated: 25 16  BSA Calculated: 1 93  O2 Saturation: 94    Physical Exam    Constitutional   General appearance: Abnormal   chronically ill, underweight, appears tired, well hydrated and Rich is stable on exam; speaking in full sentences  VSS     Eyes   Conjunctiva and lids: No swelling, erythema, or discharge  Pulmonary   Respiratory effort: No increased work of breathing or signs of respiratory distress  Auscultation of lungs: Abnormal   no rales or crackles were heard bilaterally  no rhonchi  no friction rub  wheezing over the right midlung field and Faint  diminished breath sounds over the left midlung field and diminished breath sounds over the left base  no bronchial breath sounds  Cardiovascular   Auscultation of heart: Normal rate and rhythm, normal S1 and S2, without murmurs  Abdomen   Abdomen: Non-tender, no masses  Liver and spleen: No hepatomegaly or splenomegaly  Lymphatic   Palpation of lymph nodes in neck: No lymphadenopathy  Musculoskeletal   Gait and station: Normal     Psychiatric   Orientation to person, place and time: Normal     Mood and affect: Normal          Health Management  Encounter for screening colonoscopy   COLONOSCOPY; every 10 years; Last 37URI3879; Next Due: 96ZOW8554; Active    Future Appointments    Date/Time Provider Specialty Site   05/31/2017 12:20 PM Mack Taylor DO Pulmonary Medicine [facility]   05/03/2017 03:15 PM TOSHIA Smith   Hematology [facility]   05/25/2017 09:40 AM Vikash Berkowitz MD Gastroenterology [facility]   05/09/2017 03:00 PM Darlin Tate [facility] Pul[facility]   05/10/2017 01:40 PM Jin Vega, [address] [place] [address] PALLIATIVE CARE     Signatures   Electronically signed by : Malvin Baptiste DO; May  2 2017  1:07PM EST                       (Author)

## 2018-01-16 NOTE — PROGRESS NOTES
Assessment    1  Encounter for preventive health examination (V70 0) (Z00 00)   2  Renal cell carcinoma, unspecified laterality (189 0) (C64 9)   · initial diagnosis 1999      Nexavar, Sutent 2007, Afinitor 2010, Inlyta 2013, Votrient   3  Prostate cancer screening (V76 44) (Z12 5)   4  Dyslipidemia (272 4) (E78 5)   5  Asthma (493 90) (J45 909)   6  Vitamin D deficiency (268 9) (E55 9)   7  Arthralgia (719 40) (M25 50)   8  Anxiety disorder (300 00) (F41 9)   9  Need for diphtheria-tetanus-pertussis (Tdap) vaccine (V06 1) (Z23)    Plan  Anxiety disorder, Arthralgia, Asthma, Dyslipidemia, Health Maintenance, Prostate cancer  screening, Renal cell carcinoma, unspecified laterality, Vitamin D deficiency    · Pravastatin Sodium 10 MG Oral Tablet; TAKE 1 TABLET AT BEDTIME   · (1) COMPREHENSIVE METABOLIC PANEL; Status:Active; Requested for:22Apr2016;    · (1) LIPID PANEL, FASTING; Status:Active; Requested for:22Apr2016;    · * XR SPINE LUMBAR MINIMUM 4 VIEWS; Status:Active; Requested for:22Apr2016;    · Follow-up visit in 3 months Evaluation and Treatment  Follow-up  Status: Hold For -  Scheduling  Requested for: 22Apr2016  Arthralgia, Cancer-related pain    · Methadone HCl - 5 MG Oral Tablet  Benign essential hypertension    · Potassium Chloride Ximena ER 20 MEQ Oral Tablet Extended Release; take 1  tablet by mouth once daily  Cancer-related pain    · OxyCODONE HCl - 5 MG Oral Tablet; TAKE 1-2 TABLET EVERY 4 HOURS AS  NEEDED FOR PAIN  Cancer-related pain, Chemotherapy-induced neuropathy    · Gabapentin 300 MG Oral Capsule; TAKE 1 CAPSULE 3 TIMES DAILY  Nausea    · Prochlorperazine Maleate 10 MG Oral Tablet (Compazine)  Need for diphtheria-tetanus-pertussis (Tdap) vaccine    · Adacel 5-2-15 5 LF-MCG/0 5 Intramuscular Suspension    Discussion/Summary  Impression: health maintenance visit, Pt is very stable on exam; he appears well-hydrated at this time   Currently, he eats an adequate diet and has an adequate exercise regimen  Prostate cancer screening: prostate cancer screening is current and PSA stable / normal at 0 2  Colorectal cancer screening: colorectal cancer screening is current  Screening lab work includes glucose, lipid profile and To check in 3 months - pt being started on Pravastatin for his hyperlipidemia  The risks and benefits of immunizations were discussed, immunizations are needed and Adacel given IM today, and tolerated well  Advice and education were given regarding nutrition, aerobic exercise and He is to continue a healthy diet, and remain active  PRN Oxycodone refilled for his low back, and xrays ordered  Patient discussion: discussed with the patient, discussed with the patient's family, Matilde Myers is to continue to hydrate well, and f/u with repeat FBW in 3 months, or sooner PRN  Chief Complaint  Annual Wellness today  History of Present Illness  HM, Adult Male: The patient is being seen for a health maintenance evaluation  The last health maintenance visit was 1 year(s) ago  General Health: The patient's health since the last visit is described as fair   Pt with recent hospitalization for N/V - improved  Recent (4/1/16) labs reviewed  Seeing Heme/Onc regularly for hx of renal cell adenocarcinoma (left), metastatic  [facility] [employer] paperwork today  He does not have regular dental visits  He denies vision problems  He denies hearing loss  Immunizations status: up to date   Has dentures  Needs Adacel  Lifestyle:  He consumes a diverse and healthy diet  He does not have any weight concerns  He exercises regularly  He does not use tobacco  He denies alcohol use  He denies drug use  Stays active  Has a living will  Reproductive health:  the patient is sexually active  Screening: cancer screening reviewed and current  metabolic screening reviewed and current  risk screening reviewed and current  Additional History:  Had colonoscopy in 11/2015  PSA is UTD - 0 2   Recent FBW reviewed - has not been able to get Crestor through his insurance; did not tolerate Simvastatin or Atorvastatin in the past  Recent hospitalization for dehydration - slowly improving  Had a fall prior to his recent hospitalization  Will get Adacel today  No CP/SOB  No abd pain or rectal bleeding  Ongoing low back pain; no sciatica  Urine flow his fine; chronic increased frequency  No ED  Mood is stable - no anxiety or depression  Needed chronic meds refilled  He was carefully started on Pravastatin today  He was given a script for PRN Oxycodone for his back today; he declines PT referral - lumbar spine xrays ordered  Paperwork for the [facility] [employer] was filled out for him today  Review of Systems    Constitutional: as noted in HPI  Cardiovascular: as noted in HPI  Respiratory: as noted in HPI  Gastrointestinal: as noted in HPI  Genitourinary: as noted in HPI  Musculoskeletal: as noted in HPI  Neurological: as noted in HPI  Psychiatric: as noted in HPI  Active Problems    1  Anxiety disorder (300 00) (F41 9)   2  Arthralgia (719 40) (M25 50)   3  Asthma (493 90) (J45 909)   4  Benign essential hypertension (401 1) (I10)   5  BPH (benign prostatic hypertrophy) (600 00) (N40 0)   6  Cancer-related pain (338 3) (G89 3)   7  Chemotherapy-induced neuropathy (357 6,E933 1) (G62 0,T45  1X5A)   8  Dyslipidemia (272 4) (E78 5)   9  Encounter for screening colonoscopy (V76 51) (Z12 11)   10  Enlarged prostate without lower urinary tract symptoms (luts) (600 00) (N40 0)   11  Fatigue (780 79) (R53 83)   12  GERD without esophagitis (530 81) (K21 9)   13  Hearing loss (389 9) (H91 90)   14  Hospital discharge follow-up (V67 59) (Z09)   15  Insomnia (780 52) (G47 00)   16  Malignant neoplasm metastatic to lung (197 0) (C78 00)   17  Nausea (787 02) (R11 0)   18  Need for pneumococcal vaccination (V03 82) (Z23)   19  Need for prophylactic vaccination and inoculation against influenza (V04 81) (Z23)   20   Other biomechanical lesion of abdomen or other region (738 8) (M99 89)   21  Pancreatic duct dilated (577 8) (K86 8)   22  Pleural effusion (511 9) (J90)   23  Primary localized osteoarthrosis of left shoulder (715 11) (M19 012)   24  Prostate cancer screening (V76 44) (Z12 5)   25  Renal cell carcinoma, unspecified laterality (189 0) (C64 9)   26  Shoulder pain (719 41) (M25 519)   27  Subdeltoid bursitis of right shoulder joint (726 19) (M75 51)   28  Under care of palliative care specialist (V66 7) (Z51 5)   29   Vitamin D deficiency (268 9) (E55 9)    Past Medical History    · Acute bronchitis (466 0) (J20 9)   · History of Acute bronchitis, unspecified organism (466 0) (J20 9)   · History of Acute frontal sinusitis, recurrence not specified (461 1) (J01 10)   · History of Adhesive capsulitis of left shoulder (726 0) (M75 02)   · History of Carpal tunnel syndrome, unspecified laterality (354 0) (G56 00)   · History of Colon Obstruction (560 9)   · History of chest pain (V13 89) (D04 586)   · History of urinary tract infection (V13 02) (Z87 440)   · Need for prophylactic vaccination and inoculation against influenza (V04 81) (Z23)   · History of Post traumatic stress disorder (309 81) (F43 10)   · History of Preoperative clearance (V72 84) (Z01 818)   · History of Unsteadiness on feet (781 2) (R26 81)   · History of Well adult on routine health check (V70 0) (Z00 00)    Surgical History    · History of Cataract Surgery   · History of Diagnostic Cystoscopy   · History of Hernia Repair   · History of Nephrectomy   · History of Surgery GI Reconst For Previous Esophagect W/ Colon Interpos    Family History  Mother    · Family history of Stroke Syndrome (V17 1)  Father    · Family history of Stroke Syndrome (V17 1)    Social History    · Denied: Alcohol Use (History)   · Current some day smoker (305 1) (F17 210)   · Denied: Drug Use (305 90)   · Former smoker (V15 82) (X68 043)   · Pt stated about 20 yrs ago   ·    · On disability    Current Meds   1  Advair Diskus 500-50 MCG/DOSE Inhalation Aerosol Powder Breath Activated; INHALE   1 PUFF TWICE DAILY; Therapy: 17Ygd2532 to (Marizol Salgado)  Requested for: 27KFY2993; Last   Rx:39Rll9068 Ordered   2  Citalopram Hydrobromide 20 MG Oral Tablet; TAKE 1 TABLET DAILY; Therapy: 35Ygq9975 to (Evaluate:2017)  Requested for: 83Dcp8956; Last   Rx:28Lrf9028 Ordered   3  Finasteride 5 MG Oral Tablet; TAKE 1 TABLET DAILY; Therapy: (Recorded:2014) to  Requested for: 2014 Recorded   4  Fluticasone Propionate 50 MCG/ACT Nasal Suspension; USE 2 SPRAYS IN EACH   NOSTRIL ONCE DAILY; Therapy: 42YPT9625 to (Last QL:34PUP4186)  Requested for: 02WWE2584 Ordered   5  Gabapentin 300 MG Oral Capsule; TAKE 1 CAPSULE 3 TIMES DAILY; Therapy: 88Xbr2949 to (Evaluate:2017); Last Rx:32Nya3275 Ordered   6  LORazepam 1 MG Oral Tablet; take one and one half pills bid prn anxiety; Therapy: 84SRI6589 to (Evaluate:16Vbc6129)  Requested for: 86YQE1199; Last   Rx:68Ixo5286 Ordered   7  Methadone HCl - 5 MG Oral Tablet; Take 1/2 to 1 tablet daily at bedtime for pain; Therapy: 39PUH6253 to (77 873 135); Last Rx:11Swv4462 Ordered   8  Omeprazole 20 MG Oral Capsule Delayed Release; take 1 capsule by mouth twice a   day; Therapy: 38XML2466 to (Evaluate:2016)  Requested for: 27MXY8018; Last   Rx:29Xva6545 Ordered   9  Oxybutynin Chloride ER 10 MG Oral Tablet Extended Release 24 Hour; Take 1 tablet   daily; Therapy: 65COO6208 to (Evaluate:2017)  Requested for: 59Pdw4117; Last   U71CKC3693 Ordered   10  OxyCODONE HCl - 5 MG Oral Tablet; TAKE 1-2 TABLET EVERY 4 HOURS AS NEEDED    FOR PAIN;    Therapy: 20Sin5708 to (Evaluate:2017); Last Rx:30Ify0649 Ordered   11  Potassium Chloride Ximena ER 20 MEQ Oral Tablet Extended Release; take 1 tablet by    mouth once daily; Therapy: 89ZWO0919 to (Evaluate:05Wxb7324)  Requested for: 91Zyi2815;  Last    Rx:56Bkm1493 Ordered   12  ProAir  (90 Base) MCG/ACT Inhalation Aerosol Solution; inhale 1 to 2 puffs every    4 to 6 hours if needed and as directed by prescriber; Therapy: 85POR5828 to (Evaluate:19Oct2017)  Requested for: 50RUR9872; Last    Rx:93Wda9485 Ordered   13  Prochlorperazine Maleate 10 MG Oral Tablet; take 1 tablet every 6 hours as needed for    nausea; Therapy: 32Mun5480 to (Evaluate:10Xjr4566)  Requested for: 94YZX5506; Last    Rx:72Rcw6095 Ordered   14  Tamsulosin HCl - 0 4 MG Oral Capsule; take 1 capsule by mouth at bedtime; Therapy: 49ETQ3073 to (Ced Blevins)  Requested for: 06OOQ5915; Last    Rx:10Nov2015; Status: ACTIVE - Renewal Denied Ordered   15  TraZODone HCl - 50 MG Oral Tablet; TAKE 1 AND 1/2 TABLETS AT BEDTIME     Requested for: 77XXY5090; Last Rx:25Mar2015 Ordered   16  Valsartan-Hydrochlorothiazide 320-12 5 MG Oral Tablet; TAKE 1 TABLET ONCE DAILY; Therapy: 21Jan2016 to (Evaluate:23Efe8304)  Requested for: 21Jan2016; Last    Rx:21Jan2016 Ordered   17  Votrient 200 MG Oral Tablet; 4 tabs po daily; Therapy: 06TRX1310 to (Last Rx:25Mar2016) Ordered    Allergies    1  Cymbalta CPEP   2  Effexor   3  Morphine Derivatives    4  No Known Environmental Allergies   5  No Known Food Allergies    Vitals   Recorded: 22Apr2016 10:01AM   Heart Rate 56   Respiration 12   Systolic 849   Diastolic 92   Height 5 ft 9 57 in   Weight 158 lb 8 oz   BMI Calculated 23 03   BSA Calculated 1 88     Physical Exam    Constitutional   General appearance: No acute distress, well appearing and well nourished  NAD; pleasant  Head and Face   Head and face: Normal     Eyes   Conjunctiva and lids: No erythema, swelling or discharge  Wears glasses  Ears, Nose, Mouth, and Throat   Hearing: Normal     Lips, teeth, and gums: Normal, good dentition  Oropharynx: Normal with no erythema, edema, exudate or lesions  Has dentures  Neck   Neck: Supple, symmetric, trachea midline, no masses      Pulmonary Respiratory effort: No increased work of breathing or signs of respiratory distress  Auscultation of lungs: Clear to auscultation  Cardiovascular   Auscultation of heart: Normal rate and rhythm, normal S1 and S2, no murmurs  Has his longstanding chemo port in the right upper chest    Abdomen   Abdomen: Non-tender, no masses  Liver and spleen: No hepatomegaly or splenomegaly  Lymphatic   Palpation of lymph nodes in neck: No lymphadenopathy  Musculoskeletal   Gait and station: Normal     Inspection/palpation of joints, bones, and muscles: Normal   Mild TTP in the bilateral lower lumbar paravertebral musculature; no step-offs or TTP over the spinous processes  Muscle strength/tone: Normal   Normal in the bilateral lower legs  Neurologic   Reflexes: 2+ and symmetric  Deep tendon reflexes: 2+ right patella and 2+ left patella  Psychiatric   Judgment and insight: Normal     Orientation to person, place and time: Normal     Recent and remote memory: Intact  Mood and affect: Normal        Results/Data  PHQ-2 Adult Depression Screening 22Apr2016 10:00AM User, Ahs     Test Name Result Flag Reference   PHQ-2 Adult Depression Score 0     Q1: 0, Q2: 0   PHQ-2 Adult Depression Screening Negative         Health Management  Encounter for screening colonoscopy   COLONOSCOPY; every 10 years; Next Due: 11ESU5826; Overdue    Future Appointments    Date/Time Provider Specialty Site   05/27/2016 09:00 AM TOSHIA Gonsalves  Hematology Oncology [facility]   04/29/2016 01:00 PM Herman Marsh DO Family Medicine [address]   07/22/2016 11:15 AM Herman Marsh DO Family Medicine [address] [facility]     Signatures   Electronically signed by : Javan Nogueira DO;  Apr 22 2016  1:01PM EST                       (Author)

## 2018-01-16 NOTE — MISCELLANEOUS
Assessment    1  Hospital discharge follow-up (V67 59) (Z09)   2  Pleural effusion (511 9) (J90)   3  Acute bronchitis, unspecified organism (466 0) (J20 9)   4  Malignant neoplasm metastatic to lung (197 0) (C78 00)   5  Atherosclerosis of coronary artery of native heart with unstable angina pectoris   (414 01,411 1) (I25 110)   6  Benign essential hypertension (401 1) (I10)   7  Chemotherapy-induced neuropathy (357 6,E933 1) (G62 0,T45  1X5A)   8  GERD without esophagitis (530 81) (K21 9)    Plan  GERD without esophagitis    · 1 - Cortez Benson MD (Gastroenterology) Physician Referral  Consult Only: the expectation  is that the referring provider will communicate back to the patient on treatment options  Evaluation and Treatment: the expectation is that the referred to provider will  communicate back to the patient on treatment options  Status: Active  Requested for:  21Mar2017   Ordered; For: GERD without esophagitis; Ordered By: Albina Frazier Performed:  Due: 69YBE5575  Care Summary provided  : Yes  Pleural effusion    · 1 - Wendy Taylor DO  (Pulmonary) Physician Referral  Consult Only: the expectation is that  the referring provider will communicate back to the patient on treatment options  Evaluation and Treatment: the expectation is that the referred to provider will  communicate back to the patient on treatment options  Status: Active  Requested for:  21Mar2017   Ordered; For: Pleural effusion; Ordered By: Albina Frazier Performed:  Due: 67HID0492  Care Summary provided  : Yes    TO SEE PULM AND GI  CONT WITH HEME/ONC  STABLE AT THIS POINT     Chief Complaint  Pt was at [facility] admitted 03/06/2017 for fluid on the lungs, drainage and dismissed 03/08/2017  [facility] Pt feeling good, he did have a blackout episode on Saturday, Pt doesn't remember much about it  History of Present Illness  TCM Communication [facility]: The patient is being contacted for follow-up after hospitalization and 3/21/2017   He was hospitalized at [facility]  The date of admission: 03/06/2017, date of discharge: 03/08/2017  Diagnosis: fluid in lungs  drainage  Communication performed and completed by   Bronchitis, Acute, Adult (Brief): The patient is being seen for follow-up of a hospitalization for acute bronchitis  The patient is currently asymptomatic  No associated symptoms are reported  Pleural Effusion (Brief): The patient is being seen for follow-up of a hospitalization for pleural effusion  The patient is currently asymptomatic  No associated symptoms are reported  Hypertension (Follow-Up): The patient presents for follow-up of essential hypertension  The patient states he has been doing well with his blood pressure control since the last visit  He has no significant interval events  Symptoms: The patient is currently asymptomatic  Medications: the patient is adherent with his medication regimen  Review of Systems  Complete-Male:   Constitutional: No fever or chills, feels well, no tiredness, no recent weight gain or weight loss  Eyes: No complaints of eye pain, no red eyes, no discharge from eyes, no itchy eyes  ENT: no complaints of earache, no hearing loss, no nosebleeds, no nasal discharge, no sore throat, no hoarseness  Cardiovascular: No complaints of slow heart rate, no fast heart rate, no chest pain, no palpitations, no leg claudication, no lower extremity  Respiratory: No complaints of shortness of breath, no wheezing, no cough, no SOB on exertion, no orthopnea or PND  Gastrointestinal: No complaints of abdominal pain, no constipation, no nausea or vomiting, no diarrhea or bloody stools  Genitourinary: No complaints of dysuria, no incontinence, no hesitancy, no nocturia, no genital lesion, no testicular pain  Musculoskeletal: PAIN, but as noted in HPI  Integumentary: No complaints of skin rash or skin lesions, no itching, no skin wound, no dry skin     Neurological: No compliants of headache, no confusion, no convulsions, no numbness or tingling, no dizziness or fainting, no limb weakness, no difficulty walking  Psychiatric: Is not suicidal, no sleep disturbances, no anxiety or depression, no change in personality, no emotional problems  Endocrine: No complaints of proptosis, no hot flashes, no muscle weakness, no erectile dysfunction, no deepening of the voice, no feelings of weakness  Hematologic/Lymphatic: No complaints of swollen glands, no swollen glands in the neck, does not bleed easily, no easy bruising  Active Problems    1  Abnormal ECG (794 31) (R94 31)   2  Adhesive capsulitis of right shoulder (726 0) (M75 01)   3  Allergic rhinitis due to pollen (477 0) (J30 1)   4  Anxiety disorder (300 00) (F41 9)   5  Arthralgia (719 40) (M25 50)   6  Asthma (493 90) (J45 909)   7  Atherosclerosis of coronary artery of native heart with unstable angina pectoris   (414 01,411 1) (I25 110)   8  Benign essential hypertension (401 1) (I10)   9  Blurring of vision (368 8) (H53 8)   10  BPH (benign prostatic hypertrophy) (600 00) (N40 0)   11  Cancer-related pain (338 3) (G89 3)   12  Cataract (366 9) (H26 9)   13  Chemotherapy-induced neuropathy (357 6,E933 1) (G62 0,T45  1X5A)   14  Dehydration (276 51) (E86 0)   15  Dyslipidemia (272 4) (E78 5)   16  EKG abnormalities (794 31) (R94 31)   17  Encounter for screening colonoscopy (V76 51) (Z12 11)   18  Enlarged prostate without lower urinary tract symptoms (luts) (600 00) (N40 0)   19  Fatigue (780 79) (R53 83)   20  Fuchs' corneal dystrophy (371 57) (H18 51)   21  GERD without esophagitis (530 81) (K21 9)   22  Hearing loss (389 9) (H91 90)   23  Infectious colitis (009 0) (A09)   24  Insomnia (780 52) (G47 00)   25  Malignant neoplasm metastatic to lung (197 0) (C78 00)   26  Nausea (787 02) (R11 0)   27  Need for diphtheria-tetanus-pertussis (Tdap) vaccine (V06 1) (Z23)   28  Need for pneumococcal vaccination (V03 82) (Z23)   29   Need for prophylactic vaccination and inoculation against influenza (V04 81) (Z23)   30  Need for revaccination (V05 9) (Z23)   31  Other biomechanical lesion of abdomen or other region (738 8) (M99 89)   32  Pancreatic duct dilated (577 8) (K86 89)   33  Pleural effusion (511 9) (J90)   34  Preoperative cardiovascular examination (V72 81) (Z01 810)   35  Preoperative clearance (V72 84) (Z01 818)   36  Primary localized osteoarthrosis of left shoulder (715 11) (M19 012)   37  Prostate cancer screening (V76 44) (Z12 5)   38  Renal cell adenoma of left kidney (223 0) (D30 02)   39  Renal cell carcinoma, unspecified laterality (189 0) (C64 9)   40  Shoulder pain (719 41) (M25 519)   41  Shoulder pain, right (719 41) (M25 511)   42  Sinus bradycardia (427 89) (R00 1)   43  Subdeltoid bursitis of right shoulder joint (726 19) (M75 51)   44  Under care of palliative care specialist (V66 7) (Z51 5)   45  Vitamin D deficiency (268 9) (E55 9)    Past Medical History    1  Acute bronchitis (466 0) (J20 9)   2  History of Acute bronchitis, unspecified organism (466 0) (J20 9)   3  History of Acute frontal sinusitis, recurrence not specified (461 1) (J01 10)   4  History of Adhesive capsulitis of left shoulder (726 0) (M75 02)   5  History of Carpal tunnel syndrome, unspecified laterality (354 0) (G56 00)   6  History of Colon Obstruction (560 9)   7  History of chest pain (V13 89) (Z87 898)   8  History of urinary tract infection (V13 02) (Z87 440)   9  History of Hospital discharge follow-up (V67 59) (Z09)   10  History of Hospital discharge follow-up (V67 59) (Z09)   11  Need for prophylactic vaccination and inoculation against influenza (V04 81) (Z23)   12  History of Post traumatic stress disorder (309 81) (F43 10)   13  History of Preoperative clearance (V72 84) (Z01 818)   14  History of Unsteadiness on feet (781 2) (R26 81)   15  History of Well adult on routine health check (V70 0) (Z00 00)    Surgical History    1   History of Cataract Surgery   2  History of Diagnostic Cystoscopy   3  History of Hernia Repair   4  History of Nephrectomy   5  History of Surgery GI Reconst For Previous Esophagect W/ Colon Interpos  Surgical History Reviewed: The surgical history was reviewed and updated today  Family History  Mother    1  Family history of Stroke Syndrome (V17 1)  Father    2  Family history of Stroke Syndrome (V17 1)  Family History Reviewed: The family history was reviewed and updated today  Social History    · Denied: Alcohol Use (History)   · Current some day smoker (305 1) (F17 200)   · Denied: Drug Use (305 90)   · Former smoker (A42 50) (U56 729)   ·    · On disability  Social History Reviewed: The social history was reviewed and updated today  Current Meds   1  Advair Diskus 500-50 MCG/DOSE Inhalation Aerosol Powder Breath Activated; INHALE 1   PUFF TWICE DAILY; Therapy: 22Tmd7613 to (Kimberly Andujar)  Requested for: 01VSD1761; Last   Rx:52Okg7776 Ordered   2  Cetirizine HCl - 10 MG Oral Tablet; TAKE 1 TABLET DAILY AS DIRECTED; Therapy: 80JKM8430 to (Evaluate:23Oct2016)  Requested for: 00Qor7434; Last   Rx:82Ppv1989 Ordered   3  Citalopram Hydrobromide 20 MG Oral Tablet; TAKE 1 TABLET DAILY; Therapy: 71Kzn2442 to (Evaluate:78Fmi7518)  Requested for: 88Hec8391; Last   Rx:59Hqj5326 Ordered   4  Finasteride 5 MG Oral Tablet; TAKE 1 TABLET DAILY; Therapy: (Recorded:05Pkt2185) to  Requested for: 12Jun2014 Recorded   5  Fluticasone Propionate 50 MCG/ACT Nasal Suspension; instill 2 sprays into each nostril   once daily; Therapy: 73CWQ2927 to (Evaluate:44Koj7946)  Requested for: 01Phd9883; Last   Rx:58Qnu5724 Ordered   6  LORazepam 1 MG Oral Tablet; take one and one half pills bid prn anxiety; Therapy: 92CPC1293 to (Dre Sainz)  Requested for: 88FZP6689; Last   Rx:18Cdg8587 Ordered   7  Metoprolol Tartrate 25 MG Oral Tablet; take 1/2 tablet twice a day;    Therapy: 44ETH7464 to (Evaluate:16Jun2017)  Requested for: 81GUY1051; Last   Rx:14Dxg8268 Ordered   8  OLANZapine 5 MG Oral Tablet; TAKE 1 TAB Q HS  AFTER 1 WEEK, ADD 0 5 TAB (2 5MG)   Q AM AND CONTINUE 1 TAB Q HS; Therapy: 36FDK7360 to (Evaluate:06Apr2017)  Requested for: 22NPG8105; Last   Rx:07Mar2017 Ordered   9  Omeprazole 20 MG Oral Capsule Delayed Release; take 1 capsule by mouth twice a   day; Therapy: 72OPY4094 to (Evaluate:22Nov2017)  Requested for: 91JDG2983; Last   Rx:27Nov2016 Ordered   10  Ondansetron HCl - 8 MG Oral Tablet; Take one tablet by mouth every 8 hours as needed    for nausea; Therapy: 41IGZ8514 to (Teto Munoz)  Requested for: 70RSH9788; Last    Rx:06Mar2017 Ordered   11  Oxybutynin Chloride ER 10 MG Oral Tablet Extended Release 24 Hour; Take 1 tablet    daily; Therapy: 30XKQ8119 to (Evaluate:04Jun2017)  Requested for: 22Ein9699; Last    XO:20FGV2770 Ordered   12  OxyCODONE HCl - 5 MG Oral Tablet; Take 1-2 tablets every 4 hrs as needed for pain; Therapy: 91IYL9534 to (Evaluate:08Mar2017); Last Rx:17Ijx9742 Ordered   13  Potassium Chloride Ximena ER 20 MEQ Oral Tablet Extended Release; take 1 tablet by    mouth once daily; Therapy: 50POV6285 to (Evaluate:07Jun2017)  Requested for: 88MFM0022; Last    Rx:49Pko4849 Ordered   14  Pravastatin Sodium 10 MG Oral Tablet; Take 1 tablet by mouth at bedtime; Therapy: 22Apr2016 to (Evaluate:14Apr2017)  Requested for: 74HHB1558; Last    Rx:10Emt5829 Ordered   15  ProAir  (90 Base) MCG/ACT Inhalation Aerosol Solution; INHALE 1-2 PUFFS    EVERY 4-6 HOURS AS NEEDED AND AS DIRECTED; Therapy: 23SEO6740 to (Kiara Gomes)  Requested for: 35FMR8645; Last    Rx:18Sfr5639 Ordered   16  Tamsulosin HCl - 0 4 MG Oral Capsule; TAKE 1 CAPSULE Bedtime;     Therapy: 62UIP5094 to (Evaluate:08Nov2017)  Requested for: 57EWU1573; Last    Rx:13Nov2016 Ordered   17  TraZODone HCl - 50 MG Oral Tablet; TAKE 1 AND 1/2 TABLETS AT BEDTIME  Requested    for: 66GFP3630; Last Rx:25Mar2015 Ordered   18  Valsartan 160 MG Oral Tablet; take 1 tablet by mouth every day; Therapy: 62RED8363 to (Evaluate:44Xxk6040)  Requested for: 36VGN6886; Last    Rx:13Jan2017 Ordered   19  Valsartan-Hydrochlorothiazide 320-25 MG Oral Tablet; take 1 tablet by mouth once daily; Therapy: 21Jan2016 to (Evaluate:93Ssq4516)  Requested for: 78EZG4149; Last    Rx:67Sbx8276 Ordered   20  Votrient 200 MG Oral Tablet; 4 tabs po daily; Therapy: 73QQC4695 to (Last Rx:53Oyq6817)  Requested for: 13Wde7373 Ordered  Medication List Reviewed: The medication list was reviewed and updated today  Allergies    1  Cymbalta CPEP   2  Effexor   3  Morphine Derivatives    4  No Known Environmental Allergies   5  No Known Food Allergies    Vitals  Signs   Recorded: 21Mar2017 02:25PM   Heart Rate: 64  Respiration: 16  Systolic: 017  Diastolic: 88  Weight: 099 lb 8 oz  BMI Calculated: 25 03  BSA Calculated: 1 93    Physical Exam    Constitutional   General appearance: No acute distress, well appearing and well nourished  Eyes   Conjunctiva and lids: No swelling, erythema, or discharge  Ears, Nose, Mouth, and Throat   Oropharynx: Normal with no erythema, edema, exudate or lesions  Pulmonary   Respiratory effort: No increased work of breathing or signs of respiratory distress  Auscultation of lungs: Clear to auscultation, equal breath sounds bilaterally, no wheezes, no rales, no rhonci  Cardiovascular   Auscultation of heart: Normal rate and rhythm, normal S1 and S2, without murmurs  Examination of extremities for edema and/or varicosities: Normal     Abdomen   Abdomen: Non-tender, no masses  Liver and spleen: No hepatomegaly or splenomegaly  Lymphatic   Palpation of lymph nodes in neck: No lymphadenopathy  Musculoskeletal   Gait and station: Normal     Skin   Skin and subcutaneous tissue: Normal without rashes or lesions           Results/Data  PHQ-9 Adult Depression Screening 56FYG4967 02:28PM User, Rafa     Test Name Result Flag Reference   PHQ-9 Adult Depression Score 13     Over the last two weeks, how often have you been bothered by any of the following problems? Little interest or pleasure in doing things: Nearly every day - 3  Feeling down, depressed, or hopeless: Several days - 1  Trouble falling or staying asleep, or sleeping too much: Several days - 1  Feeling tired or having little energy: More than half the days - 2  Poor appetite or over eating: Nearly every day - 3  Feeling bad about yourself - or that you are a failure or have let yourself or your family down: Not at all - 0  Trouble concentrating on things, such as reading the newspaper or watching television: Several days - 1  Moving or speaking so slowly that other people could have noticed  Or the opposite -  being so fidgety or restless that you have been moving around a lot more than usual: More than half the days - 2  Thoughts that you would be better off dead, or of hurting yourself in some way: Not at all - 0   PHQ-9 Adult Depression Screening Positive     PHQ-9 Difficulty Level Not difficult at all     PHQ-9 Severity Moderate Depression         Health Management  Encounter for screening colonoscopy   COLONOSCOPY; every 10 years; Last 46QAV1153; Next Due: 82TYU3658; Active    Future Appointments    Date/Time Provider Specialty Site   04/04/2017 05:20 PM Alexandrea Taylor DO Pulmonary Medicine [facility] PULMONARY [facility]   04/07/2017 08:00 AM TOSHIA Karimi   Hematology Oncology [facility]   05/03/2017 09:20 AM Kit Adames [facility] PALLIATIVE CARE     Signatures   Electronically signed by : Kitty Rodriguez DO; Mar 21 2017  2:47PM EST                       (Author)

## 2018-01-16 NOTE — MISCELLANEOUS
Message   Recorded as Task   Date: 04/05/2017 08:18 AM, Created By: Lynn Santamaria   Task Name: Call Back   Assigned To: Adeline Sullivan   Regarding Patient: Emanuel Santos, Status: In Progress   Comment:    Pete Slater - 05 Apr 2017 8:18 AM     TASK CREATED  Patient called stating "he would like to speak to you in regards to a copay for medication     Adeline Sullivan - 05 Apr 2017 8:34 AM     TASK IN PROGRESS   Adeline Sullivan - 05 Apr 2017 8:35 AM     TASK EDITED  patient's jada is up back for votrient  I will contact financial counselor  patient aware        Active Problems    1  Abnormal ECG (794 31) (R94 31)   2  Acute maxillary sinusitis, recurrence not specified (461 0) (J01 00)   3  Adhesive capsulitis of right shoulder (726 0) (M75 01)   4  Allergic rhinitis due to pollen (477 0) (J30 1)   5  Anxiety disorder (300 00) (F41 9)   6  Arthralgia (719 40) (M25 50)   7  Asthma (493 90) (J45 909)   8  Atherosclerosis of coronary artery of native heart with unstable angina pectoris   (414 01,411 1) (I25 110)   9  Benign essential hypertension (401 1) (I10)   10  BPH (benign prostatic hypertrophy) (600 00) (N40 0)   11  Cancer-related pain (338 3) (G89 3)   12  Cataract (366 9) (H26 9)   13  Chemotherapy-induced neuropathy (357 6,E933 1) (G62 0,T45  1X5A)   14  Dyslipidemia (272 4) (E78 5)   15  EKG abnormalities (794 31) (R94 31)   16  Encounter for screening colonoscopy (V76 51) (Z12 11)   17  Enlarged prostate without lower urinary tract symptoms (luts) (600 00) (N40 0)   18  Fatigue (780 79) (R53 83)   19  Fuchs' corneal dystrophy (371 57) (H18 51)   20  GERD without esophagitis (530 81) (K21 9)   21  Hearing loss (389 9) (H91 90)   22  Insomnia (780 52) (G47 00)   23  Malignant neoplasm metastatic to lung (197 0) (C78 00)   24  Nausea (787 02) (R11 0)   25  Need for diphtheria-tetanus-pertussis (Tdap) vaccine (V06 1) (Z23)   26  Need for pneumococcal vaccination (V03 82) (Z23)   27   Need for prophylactic vaccination and inoculation against influenza (V04 81) (Z23)   28  Need for revaccination (V05 9) (Z23)   29  Other biomechanical lesion of abdomen or other region (738 8) (M99 89)   30  Pancreatic duct dilated (577 8) (K86 89)   31  Pleural effusion (511 9) (J90)   32  Preoperative clearance (V72 84) (Z01 818)   33  Primary localized osteoarthrosis of left shoulder (715 11) (M19 012)   34  Prostate cancer screening (V76 44) (Z12 5)   35  Renal cell adenoma of left kidney (223 0) (D30 02)   36  Renal cell carcinoma, unspecified laterality (189 0) (C64 9)   37  Shoulder pain (719 41) (M25 519)   38  Shoulder pain, right (719 41) (M25 511)   39  Sinus bradycardia (427 89) (R00 1)   40  Subdeltoid bursitis of right shoulder joint (726 19) (M75 51)   41  Under care of palliative care specialist (V66 7) (Z51 5)   42  Vitamin D deficiency (268 9) (E55 9)    Current Meds   1  Advair Diskus 500-50 MCG/DOSE Inhalation Aerosol Powder Breath Activated; INHALE   1 PUFF TWICE DAILY; Therapy: 29Zye0662 to (Cloteal Raw)  Requested for: 65NBB3612; Last   Rx:63Cqm6375 Ordered   2  Amoxicillin-Pot Clavulanate 875-125 MG Oral Tablet (Augmentin); TAKE 1 TABLET   EVERY 12 HOURS WITH MEALS UNTIL GONE;   Therapy: 99RBS1715 to (Complete:14Apr2017)  Requested for: 69HDT2423; Last   Rx:12Bqv3404 Ordered   3  Cetirizine HCl - 10 MG Oral Tablet; TAKE 1 TABLET DAILY AS DIRECTED; Therapy: 20MMB1097 to (Evaluate:73Msh3420)  Requested for: 55Emb5525; Last   Rx:68Pfi9885 Ordered   4  Citalopram Hydrobromide 20 MG Oral Tablet; TAKE 1 TABLET DAILY; Therapy: 55Obl3547 to (Evaluate:66Mbn4020)  Requested for: 95Cqv1409; Last   Rx:21Vfi0575 Ordered   5  Finasteride 5 MG Oral Tablet; TAKE 1 TABLET DAILY; Therapy: (Recorded:12Jun2014) to  Requested for: 12Jun2014 Recorded   6  Fluticasone Propionate 50 MCG/ACT Nasal Suspension; instill 2 sprays into each nostril   once daily;    Therapy: 68PVB3849 to (Evaluate:03Osd6163) Requested for: 81Imc5097; Last   Rx:46Xbz2905 Ordered   7  LORazepam 1 MG Oral Tablet; TAKE 1 5 TABLET Twice daily PRN anxiety; Therapy: 98VOO4452 to (Evaluate:05Dgd7656)  Requested for: 10IYU9138 Recorded   8  Metoprolol Tartrate 25 MG Oral Tablet; take 1/2 tablet twice a day; Therapy: 62EOM5227 to (Evaluate:16Jun2017)  Requested for: 99FHN5544; Last   Rx:15Vtd8026 Ordered   9  OLANZapine 5 MG Oral Tablet; TAKE 1 TAB Q HS  AFTER 1 WEEK, ADD 0 5 TAB   (2 5MG) Q AM AND CONTINUE 1 TAB Q HS; Therapy: 74TUU7600 to [phone])  Requested for: 40QXF2283; Last   Rx:07Mar2017 Ordered   10  Omeprazole 20 MG Oral Capsule Delayed Release; take 1 capsule by mouth twice a    day; Therapy: 49DAN2603 to (Evaluate:22Nov2017)  Requested for: 23LDS1204; Last    Rx:27Nov2016 Ordered   11  Ondansetron HCl - 8 MG Oral Tablet; Take one tablet by mouth every 8 hours as needed    for nausea; Therapy: 73NPN5221 to [phone])  Requested for: 44MHG1351; Last    Rx:06Mar2017 Ordered   12  Oxybutynin Chloride ER 10 MG Oral Tablet Extended Release 24 Hour; Take 1 tablet    daily; Therapy: 14FBD1623 to (Evaluate:04Jun2017)  Requested for: 33Tsg6876; Last    GT:83UIQ9458 Ordered   13  OxyCODONE HCl - 5 MG Oral Tablet; Take 1-2 tablets every 3 hrs as needed for     CANCER pain  Mod-severe; Therapy: 61VXE4685 to (Evaluate:08Apr2017) Recorded   14  Potassium Chloride Ximena ER 20 MEQ Oral Tablet Extended Release; take 1 tablet by    mouth once daily; Therapy: 91RWD6691 to (Evaluate:07Jun2017)  Requested for: 25SHC1718; Last    Rx:80Qoj7822 Ordered   15  Pravastatin Sodium 10 MG Oral Tablet; Take 1 tablet by mouth at bedtime; Therapy: 95Jbi1461 to (Evaluate:14Apr2017)  Requested for: 79FZR5596; Last    Rx:32Wnx3784 Ordered   16  ProAir  (90 Base) MCG/ACT Inhalation Aerosol Solution; INHALE 1-2 PUFFS    EVERY 4-6 HOURS AS NEEDED AND AS DIRECTED;     Therapy: 70QYL1966 to (Forrest Preston)  Requested for: 75SLX9290; Last    Rx:38Wey0309 Ordered   17  Tamsulosin HCl - 0 4 MG Oral Capsule; TAKE 1 CAPSULE Bedtime; Therapy: 32NAY5855 to (Suellyn Frankel)  Requested for: 62TRE3412; Last    Rx:13Nov2016 Ordered   18  TraZODone HCl - 50 MG Oral Tablet; TAKE 1 AND 1/2 TABLETS AT BEDTIME     Requested for: 71XYJ4297; Last Rx:25Mar2015 Ordered   19  Valsartan 160 MG Oral Tablet; take 1 tablet by mouth every day; Therapy: 05XPQ8481 to (Evaluate:96Bwb6410)  Requested for: 71YKW9907; Last    Rx:13Jan2017 Ordered   20  Valsartan-Hydrochlorothiazide 320-25 MG Oral Tablet; take 1 tablet by mouth once    daily; Therapy: 21Jan2016 to (Evaluate:94Pzw4174)  Requested for: 98RIM2532; Last    Rx:59Xel1475 Ordered   21  Votrient 200 MG Oral Tablet; 4 tabs po daily; Therapy: 11WBB7320 to (Last Rx:81Mhg1358)  Requested for: 15Icw0621 Ordered    Allergies    1  Cymbalta CPEP   2  Effexor   3  Morphine Derivatives    4  No Known Environmental Allergies   5   No Known Food Allergies    Signatures   Electronically signed by : Neno Goyal, ; Apr 5 2017  8:35AM EST                       (Author)

## 2018-01-17 NOTE — MISCELLANEOUS
Message   Recorded as Task   Date: 05/19/2017 10:05 AM, Created By: Teresa Cheng   Task Name: Medical Complaint Callback   Assigned To: Colby Hurst   Regarding Patient: Jac León, Status: Active   CommentJimmy Pantoja - 19 May 2017 10:05 AM     TASK CREATED  Caller: [facility] nurse; Medical Complaint; [phone]  Visiting nurse is requesting medication to help patient's appetite and pt states that the Zofran 4mg isn't helping the nausea  She is asking to increase the strength  Please call back and advise  Adeline Sullivan - 19 May 2017 10:18 AM     TASK EDITED  d/w dr Flores Sheets ordered  VNA informed        Active Problems    1  Abnormal ECG (794 31) (R94 31)   2  Acute bronchospasm (519 11) (J98 01)   3  Acute maxillary sinusitis, recurrence not specified (461 0) (J01 00)   4  Adhesive capsulitis of right shoulder (726 0) (M75 01)   5  Allergic rhinitis due to pollen (477 0) (J30 1)   6  Anxiety disorder (300 00) (F41 9)   7  Arthralgia (719 40) (M25 50)   8  Asthma (493 90) (J45 909)   9  Atherosclerosis of coronary artery of native heart with unstable angina pectoris   (414 01,411 1) (I25 110)   10  Benign essential hypertension (401 1) (I10)   11  BPH (benign prostatic hypertrophy) (600 00) (N40 0)   12  Cancer-related pain (338 3) (G89 3)   13  Cataract (366 9) (H26 9)   14  Chemotherapy-induced neuropathy (357 6,E933 1) (G62 0,T45  1X5A)   15  Dyslipidemia (272 4) (E78 5)   16  EKG abnormalities (794 31) (R94 31)   17  Encounter for screening colonoscopy (V76 51) (Z12 11)   18  Enlarged prostate without lower urinary tract symptoms (luts) (600 00) (N40 0)   19  Fatigue (780 79) (R53 83)   20  Fuchs' corneal dystrophy (371 57) (H18 51)   21  GERD without esophagitis (530 81) (K21 9)   22  Hearing loss (389 9) (H91 90)   23  Hypotension (458 9) (I95 9)   24  Hypoxia (799 02) (R09 02)   25  Insomnia (780 52) (G47 00)   26  Malignant neoplasm metastatic to lung (197 0) (C78 00)   27   Nausea (787 02) (R11 0)   28  Need for diphtheria-tetanus-pertussis (Tdap) vaccine (V06 1) (Z23)   29  Need for pneumococcal vaccination (V03 82) (Z23)   30  Need for prophylactic vaccination and inoculation against influenza (V04 81) (Z23)   31  Need for revaccination (V05 9) (Z23)   32  Other biomechanical lesion of abdomen or other region (738 8) (M99 89)   33  Pancreatic duct dilated (577 8) (K86 89)   34  Pleural effusion (511 9) (J90)   35  Preoperative clearance (V72 84) (Z01 818)   36  Primary localized osteoarthrosis of left shoulder (715 11) (M19 012)   37  Prostate cancer screening (V76 44) (Z12 5)   38  Renal cell adenoma of left kidney (223 0) (D30 02)   39  Renal cell carcinoma, unspecified laterality (189 0) (C64 9)   40  Shoulder pain (719 41) (M25 519)   41  Shoulder pain, right (719 41) (M25 511)   42  Sinus bradycardia (427 89) (R00 1)   43  Subdeltoid bursitis of right shoulder joint (726 19) (M75 51)   44  Under care of palliative care specialist (V66 7) (Z51 5)   45  Vitamin D deficiency (268 9) (E55 9)    Current Meds   1  Advair Diskus 500-50 MCG/DOSE Inhalation Aerosol Powder Breath Activated; inhale 1   dose by mouth twice a day; Therapy: 54Qfr7646 to (Evaluate:31Lul8456)  Requested for: 99BFC3801; Last   Rx:05Apr2017 Ordered   2  Albuterol Sulfate (2 5 MG/3ML) 0 083% Inhalation Nebulization Solution; USE 1 UNIT   DOSE EVERY 4-6 HOURS AS NEEDED FOR WHEEZING ; Therapy: 60CBY7821 to (Last Rx:79Zrz7684)  Requested for: 65QVG5773 Ordered   3  Cabometyx 20 MG Oral Tablet; 3 TABLETS BY MOUTH DAILY; Therapy: 28BWW9553 to (Last KJ:96VMY4746) Ordered   4  Cetirizine HCl - 10 MG Oral Tablet; TAKE 1 TABLET DAILY AS DIRECTED; Therapy: 47NJW5776 to (Evaluate:23Oct2016)  Requested for: 27Smf5701; Last   Rx:37Pzv5433 Ordered   5  Citalopram Hydrobromide 20 MG Oral Tablet; TAKE 1 TABLET DAILY; Therapy: 26Bhg5182 to (Evaluate:70Dmb2476)  Requested for: 20Jul2016; Last   Rx:87Joc2300 Ordered   6  Finasteride 5 MG Oral Tablet; TAKE 1 TABLET DAILY; Therapy: (Recorded:12Jun2014) to  Requested for: 12Jun2014 Recorded   7  Fluticasone Propionate 50 MCG/ACT Nasal Suspension; instill 2 sprays into each nostril   once daily; Therapy: 23OWD3308 to (Evaluate:25Ilb2086)  Requested for: 38Owu3274; Last   Rx:64Yql5495 Ordered   8  LORazepam 1 MG Oral Tablet; TAKE 1 5 TABLET Twice daily PRN anxiety; Therapy: 65KUP2339 to (Evaluate:31Tdc7078)  Requested for: 12Apr2017; Status:   ACTIVE - Renewal Denied Recorded   9  Metoprolol Tartrate 25 MG Oral Tablet; take 1/2 tablet twice a day; Therapy: 87OZB4373 to (Evaluate:16Jun2017)  Requested for: 38PWH6763; Last   Rx:58Jkn8423 Ordered   10  OLANZapine 5 MG Oral Tablet; TAKE 1 TAB Q HS  AFTER 1 WEEK, ADD 0 5 TAB    (2 5MG) Q AM AND CONTINUE 1 TAB Q HS; Therapy: 53OWC0382 to (Haile Mondragon)  Requested for: 68WFN5380; Last    Rx:07Mar2017 Ordered   11  Omeprazole 20 MG Oral Capsule Delayed Release; take 1 capsule by mouth twice a    day; Therapy: 88VXB8252 to (Evaluate:22Nov2017)  Requested for: 93RHF5885; Last    Rx:27Nov2016 Ordered   12  Ondansetron HCl - 8 MG Oral Tablet; Take one tablet by mouth every 8 hours as needed    for nausea; Therapy: 63ZRN8336 to (Evaluate:09Jun2017)  Requested for: 36AMP1372; Last    Rx:15Evb6166 Ordered   13  Oxybutynin Chloride ER 10 MG Oral Tablet Extended Release 24 Hour; Take 1 tablet    daily; Therapy: 34EWZ2029 to (Evaluate:04Jun2017)  Requested for: 97Uve1982; Last    DF:04ULK0587 Ordered   14  OxyCODONE HCl - 10 MG Oral Tablet; TAKE 1 TABLET Every 4 hours PRN pain; Therapy: 12NQR0355 to (Evaluate:09Jun2017); Last Rx:82Ocy9300 Ordered   15  Potassium Chloride Ximena ER 20 MEQ Oral Tablet Extended Release; take 1 tablet by    mouth once daily; Therapy: 06THE0554 to (Evaluate:07Jun2017)  Requested for: 89USV2788; Last    Rx:08Zxn6397 Ordered   16  Pravastatin Sodium 10 MG Oral Tablet;  Take 1 tablet by mouth at bedtime; Therapy: 22Apr2016 to (Evaluate:13Oct2017)  Requested for: 00Nra2835; Last    Rx:16Apr2017 Ordered   17  ProAir  (90 Base) MCG/ACT Inhalation Aerosol Solution; INHALE 1-2 PUFFS    EVERY 4-6 HOURS AS NEEDED AND AS DIRECTED; Therapy: 82PEH5473 to (Lynn Camilo)  Requested for: 37ILP0435; Last    Rx:00Hjb2985 Ordered   18  Tamsulosin HCl - 0 4 MG Oral Capsule; TAKE 1 CAPSULE Bedtime; Therapy: 92EUD3797 to (Evaluate:08Nov2017)  Requested for: 18TSS4969; Last    Rx:13Nov2016 Ordered   19  TraZODone HCl - 50 MG Oral Tablet; TAKE 1 AND 1/2 TABLETS AT BEDTIME     Requested for: 67NRD1167; Last Rx:25Mar2015 Ordered   20  Valsartan 160 MG Oral Tablet; take 1 tablet by mouth every day; Therapy: 32SRC4126 to (Evaluate:43Iuc2843)  Requested for: 76YIX9195; Last    Rx:13Jan2017 Ordered   21  Valsartan-Hydrochlorothiazide 320-25 MG Oral Tablet; take 1 tablet by mouth once    daily; Therapy: 21Jan2016 to (Evaluate:65Dub7504)  Requested for: 64XNA7967; Last    Rx:85Yxx6222 Ordered    Allergies    1  Cymbalta CPEP   2  Effexor   3  Morphine Derivatives    4  No Known Environmental Allergies   5   No Known Food Allergies    Signatures   Electronically signed by : Patience Perkins, ; May 19 2017 10:18AM EST                       (Author)

## 2018-01-17 NOTE — PROGRESS NOTES
Assessment    1  Cancer-related pain (338 3) (G89 3)   2  Anxiety disorder (300 00) (F41 9)   3  Nausea (787 02) (R11 0)   4  Renal cell adenoma of left kidney (223 0) (D30 02)   5  Malignant neoplasm metastatic to lung (197 0) (C78 00)    Plan  Anxiety disorder, Cancer-related pain, Chemotherapy-induced neuropathy, Malignant  neoplasm metastatic to lung, Nausea, Renal cell adenoma  of left kidney, Renal cell carcinoma, unspecified laterality    · Palliative Flow Sheet; Status:Complete - Retrospective Authorization;   Done: 81KPZ4269  10:19AM   Performed: In Office; Due:11Mar2017; Last Updated By:Margi Ibrahim; 3/6/2017 10:20:25 AM;Ordered; For:Anxiety disorder, Cancer-related pain, Chemotherapy-induced neuropathy, Malignant neoplasm metastatic to lung, Nausea, Renal cell adenoma of left kidney, Renal cell carcinoma, unspecified laterality; Ordered By:Jose Geogres; Anxiety disorder, Insomnia    · OLANZapine 5 MG Oral Tablet; TAKE 1 TAB Q HS  AFTER 1 WEEK, ADD 0 5 TAB  (2 5MG) Q AM AND CONTINUE 1 TAB Q HS   Rx By: Jaguar Hooper; Dispense: 30 Days ; #:45 Tablet; Refill: 0; For: Anxiety disorder, Insomnia; RAFI = N; Verified Transmission to [facility] [place] [address]; Last Updated By: System, [employer]; 3/7/2017 4:32:00 PM  Nausea    · Prochlorperazine Maleate 5 MG Oral Tablet   Rx By: Jaguar Hooper; Dispense: 30 Days ; #:120 Tablet; Refill: 0; For: Nausea; RAFI = N; Sent To: [facility] [place] [address]   · Ondansetron HCl - 8 MG Oral Tablet; Take one tablet by mouth every 8 hours as  needed for nausea   Rx By: Jaguar Hooper; Dispense: 30 Days ; #:90 Tablet; Refill: 0; For: Nausea; RAFI = N; Verified Transmission to [facility] [place] [address]; Last Updated By: System, [employer]; 3/6/2017 11:38:23 AM    Discussion/Summary  Discussion Summary:   - Cancer-related pain with RCC, and pain related to adhesive capsulitis: Oxycodone 1-2 tabs (5-10mg) every 3 hours as needed       - Chronic nausea, anxiety, difficulty sleeping: Increase olanzapine to 5mg nightly for one week, then add 2 5mg in morning (and continue 5mg at bedtime)  Continue with citalopram and lorazepam  Palliative care  is available as needed  URI symptoms: Contact primary care office  Goals and Barriers: The patient has the current Goals: Follow up with primary care and psychiatry through [place]  The patent has the current Barriers: None  Patient's Capacity to Self-Care: Patient is able to Self-Care  Medication SE Review and Pt Understands Tx: Possible side effects of new medications were reviewed with the patient/guardian today  The treatment plan was reviewed with the patient/guardian  The patient/guardian understands and agrees with the treatment plan   Self Referrals:   Self Referrals: No   Transitional Care: Continuing care needed for: Symptom management  Co-manage care with PCP/Referring Provider  Chief Complaint  Chief Complaint Free Text Note Form: Follow up cancer-related pain, metastatic renal call carcinoma      History of Present Illness  HPI: Has had URI symptoms for about 3 weeks, with fatigue, rhinorrhea, cough, sweating; feels he is getting worse  Has vomited in his sleep several times into his CPAP mask  No vomiting during day and is able to keep food down  Struggles with chronic nausea that he attributes to Votrient  Chronic generalized pains, especially of back, shoulder, ribs has been tolerable, taking oxycodone as needed usually 1 or 2 tabs during day and 2 tabs at night  Tries to distract himself from the pain with other activities  Does not need refill of oxycodone  Continues to struggle with social stressors including raising his teenage grandchildren, financial concerns, etc  In particular has been cleaning his apartment daily and cooking a lot with the goals of wearing himself out so he can sleep better, diffusing anger and stress, etc  Takes lorazepam a couple times daily for anxiety   Takes trazodone during the day rather than at bedtime to keep himself on an [address] with his mood and prevent himself from becoming angry  Has noted a change in his vision yesterday with rainbow colors  Feels the change is similar to other vision changes he has experienced  Follows with ophthalmology for management of Fuchs' corneal dystrophy and cataracts; has a follow up in a couple days  Felt somewhat dizzy yesterday  No dizziness today  BP elevated in clinic today - 166/106  Similar upon recheck  [facility] from his [employer]  Has phone appointment with psychiatry through [facility] on Wednesday  Review of Systems  ROS Reviewed:   ROS reviewed  Active Problems    1  Abnormal ECG (794 31) (R94 31)   2  Adhesive capsulitis of right shoulder (726 0) (M75 01)   3  Allergic rhinitis due to pollen (477 0) (J30 1)   4  Anxiety disorder (300 00) (F41 9)   5  Arthralgia (719 40) (M25 50)   6  Asthma (493 90) (J45 909)   7  Atherosclerosis of coronary artery of native heart with unstable angina pectoris   (414 01,411 1) (I25 110)   8  Benign essential hypertension (401 1) (I10)   9  Blurring of vision (368 8) (H53 8)   10  BPH (benign prostatic hypertrophy) (600 00) (N40 0)   11  Cancer-related pain (338 3) (G89 3)   12  Cataract (366 9) (H26 9)   13  Chemotherapy-induced neuropathy (357 6,E933 1) (G62 0,T45  1X5A)   14  Dehydration (276 51) (E86 0)   15  Dyslipidemia (272 4) (E78 5)   16  EKG abnormalities (794 31) (R94 31)   17  Encounter for screening colonoscopy (V76 51) (Z12 11)   18  Enlarged prostate without lower urinary tract symptoms (luts) (600 00) (N40 0)   19  Fatigue (780 79) (R53 83)   20  Fuchs' corneal dystrophy (371 57) (H18 51)   21  GERD without esophagitis (530 81) (K21 9)   22  Hearing loss (389 9) (H91 90)   23  Hospital discharge follow-up (V67 59) (Z09)   24  Infectious colitis (009 0) (A09)   25  Insomnia (780 52) (G47 00)   26   Malignant neoplasm metastatic to lung (197 0) (C78 00)   27  Nausea (787 02) (R11 0)   28  Need for diphtheria-tetanus-pertussis (Tdap) vaccine (V06 1) (Z23)   29  Need for pneumococcal vaccination (V03 82) (Z23)   30  Need for prophylactic vaccination and inoculation against influenza (V04 81) (Z23)   31  Need for revaccination (V05 9) (Z23)   32  Other biomechanical lesion of abdomen or other region (738 8) (M99 89)   33  Pancreatic duct dilated (577 8) (K86 89)   34  Pleural effusion (511 9) (J90)   35  Preoperative cardiovascular examination (V72 81) (Z01 810)   36  Preoperative clearance (V72 84) (Z01 818)   37  Primary localized osteoarthrosis of left shoulder (715 11) (M19 012)   38  Prostate cancer screening (V76 44) (Z12 5)   39  Renal cell adenoma of left kidney (223 0) (D30 02)   40  Renal cell carcinoma, unspecified laterality (189 0) (C64 9)   41  Shoulder pain (719 41) (M25 519)   42  Shoulder pain, right (719 41) (M25 511)   43  Sinus bradycardia (427 89) (R00 1)   44  Subdeltoid bursitis of right shoulder joint (726 19) (M75 51)   45  Under care of palliative care specialist (V66 7) (Z51 5)   46  Vitamin D deficiency (268 9) (E55 9)    Past Medical History    1  Acute bronchitis (466 0) (J20 9)   2  History of Acute bronchitis, unspecified organism (466 0) (J20 9)   3  History of Acute frontal sinusitis, recurrence not specified (461 1) (J01 10)   4  History of Adhesive capsulitis of left shoulder (726 0) (M75 02)   5  History of Carpal tunnel syndrome, unspecified laterality (354 0) (G56 00)   6  History of Colon Obstruction (560 9)   7  History of chest pain (V13 89) (Z87 898)   8  History of urinary tract infection (V13 02) (Z87 440)   9  History of Hospital discharge follow-up (V67 59) (Z09)   10  Need for prophylactic vaccination and inoculation against influenza (V04 81) (Z23)   11  History of Post traumatic stress disorder (309 81) (F43 10)   12  History of Preoperative clearance (V72 84) (Z01 818)   13   History of Unsteadiness on feet (781 2) (R26 81)   14  History of Well adult on routine health check (V70 0) (Z00 00)    Surgical History    1  History of Cataract Surgery   2  History of Diagnostic Cystoscopy   3  History of Hernia Repair   4  History of Nephrectomy   5  History of Surgery GI Reconst For Previous Esophagect W/ Colon Interpos    Family History  Mother    1  Family history of Stroke Syndrome (V17 1)  Father    2  Family history of Stroke Syndrome (V17 1)    Social History    · Denied: Alcohol Use (History)   · Current some day smoker (305 1) (F17 200)   · Denied: Drug Use (305 90)   · Former smoker (N11 87) (R45 876)   ·    · On disability    Current Meds   1  Advair Diskus 500-50 MCG/DOSE Inhalation Aerosol Powder Breath Activated; INHALE 1   PUFF TWICE DAILY; Therapy: 80Pee9682 to (Lata Bush)  Requested for: 93WBS9380; Last   Rx:21Exk6235 Ordered   2  Cetirizine HCl - 10 MG Oral Tablet; TAKE 1 TABLET DAILY AS DIRECTED; Therapy: 81BIB8469 to (Evaluate:11Zwy5504)  Requested for: 70Tst3918; Last   Rx:11Guu8372 Ordered   3  Citalopram Hydrobromide 20 MG Oral Tablet; TAKE 1 TABLET DAILY; Therapy: 67Rgu0783 to (Evaluate:37Rua8894)  Requested for: 33Xuo9399; Last   Rx:56Lji2339 Ordered   4  Finasteride 5 MG Oral Tablet; TAKE 1 TABLET DAILY; Therapy: (Recorded:29Rjf0375) to  Requested for: 70Dyj7670 Recorded   5  Fluticasone Propionate 50 MCG/ACT Nasal Suspension; instill 2 sprays into each nostril   once daily; Therapy: 53VWV1491 to (Evaluate:55Amj9325)  Requested for: 87Rey3844; Last   Rx:54Myl5580 Ordered   6  LORazepam 1 MG Oral Tablet; take one and one half pills bid prn anxiety; Therapy: 64ZMU5811 to (Lb Torres)  Requested for: 80MFJ2240; Last   Rx:14Edw3547 Ordered   7  Metoprolol Tartrate 25 MG Oral Tablet; take 1/2 tablet twice a day; Therapy: 43HIL2902 to (Evaluate:02Qox1090)  Requested for: 75SAN6314; Last   Rx:55Xjy4039 Ordered   8   MetroNIDAZOLE 500 MG Oral Tablet; TAKE 1 TABLET Every 8 hours; Therapy: 60VDR7787 to (Anthonyleene Cords)  Requested for: 18HGB5739; Last   Rx:02Nov2016 Ordered   9  OLANZapine 2 5 MG Oral Tablet; TAKE 1 TABLET Bedtime; Therapy: 63GTC3205 to (Ellie Doan)  Requested for: 43GPC8201; Last   Rx:41Ufk1187 Ordered   10  Omeprazole 20 MG Oral Capsule Delayed Release; take 1 capsule by mouth twice a    day; Therapy: 63AJS5787 to (Evaluate:22Nov2017)  Requested for: 50MRA4258; Last    Rx:27Nov2016 Ordered   11  Ondansetron HCl - 4 MG Oral Tablet; TAKE 1 TABLET Every 4 hours PRN nausea; Therapy: 74QFE7561 to (Ellie Doan)  Requested for: 24ZIN5280; Last    Rx:17Buv6569 Ordered   12  Oxybutynin Chloride ER 10 MG Oral Tablet Extended Release 24 Hour; Take 1 tablet    daily; Therapy: 88ZJI4649 to (Evaluate:04Jun2017)  Requested for: 00Opc3121; Last    RN:28YQP9952 Ordered   13  OxyCODONE HCl - 5 MG Oral Tablet; Take 1-2 tablets every 4 hrs as needed for pain; Therapy: 93LHW6199 to (Evaluate:08Mar2017); Last Rx:69Vum6187 Ordered   14  Potassium Chloride Ximena ER 20 MEQ Oral Tablet Extended Release; take 1 tablet by    mouth once daily; Therapy: 20CKJ8754 to (Evaluate:07Jun2017)  Requested for: 43QUF2159; Last    Rx:69Zcx9670 Ordered   15  Pravastatin Sodium 10 MG Oral Tablet; Take 1 tablet by mouth at bedtime; Therapy: 52Flr5390 to (Evaluate:14Apr2017)  Requested for: 69VLZ8000; Last    Rx:60Das9810 Ordered   16  ProAir  (90 Base) MCG/ACT Inhalation Aerosol Solution; INHALE 1-2 PUFFS    EVERY 4-6 HOURS AS NEEDED AND AS DIRECTED; Therapy: 04TNE5670 to (Darleene Cords)  Requested for: 38RSV8808; Last    Rx:20Nzu0332 Ordered   17  Prochlorperazine Maleate 5 MG Oral Tablet; TAKE 1 TABLET Every 6 hours PRN nausea; Therapy: 23RKL9252 to (Ellie Doan)  Requested for: 66QTP0951; Last    Rx:92Oas8012 Ordered   18  Tamsulosin HCl - 0 4 MG Oral Capsule; TAKE 1 CAPSULE Bedtime;     Therapy: 65GWM6507 to Sunni Smith) Requested for: 62IJL9826; Last    Rx:13Nov2016 Ordered   19  TraZODone HCl - 50 MG Oral Tablet; TAKE 1 AND 1/2 TABLETS AT BEDTIME  Requested    for: 53QCE3488; Last Rx:25Mar2015 Ordered   20  Valsartan 160 MG Oral Tablet; take 1 tablet by mouth every day; Therapy: 15OBO3425 to (Evaluate:36Ndi4272)  Requested for: 78HEK4466; Last    Rx:13Jan2017 Ordered   21  Valsartan-Hydrochlorothiazide 320-25 MG Oral Tablet; take 1 tablet by mouth once daily; Therapy: 21Jan2016 to (Evaluate:45Mkp1972)  Requested for: 09PTP8401; Last    Rx:60Mnn0014 Ordered   22  Votrient 200 MG Oral Tablet; 4 tabs po daily; Therapy: 24THK3043 to (Last Rx:04Cpa3428)  Requested for: 33Wkp1499 Ordered    Allergies    1  Cymbalta CPEP   2  Effexor   3  Morphine Derivatives    4  No Known Environmental Allergies   5  No Known Food Allergies    Vitals  Vital Signs    Recorded: 05LII0414 10:34AM   Temperature 99 6 F, Oral   Heart Rate 67   Respiration 16   Systolic 552, RUE, Sitting   Diastolic 230, RUE, Sitting   Height 5 ft 9 in   Weight 172 lb    BMI Calculated 25 4   BSA Calculated 1 94   O2 Saturation 94     Physical Exam    Constitutional   General appearance: Abnormal   well developed, uncomfortable, well nourished, clothing appropriate, well groomed, rested and not exhausted  Eyes   Conjunctiva and lids: No swelling, erythema, or discharge  Pulmonary   Respiratory effort: No increased work of breathing or signs of respiratory distress  Musculoskeletal   Gait and station: Normal     Skin   Skin and subcutaneous tissue: Normal without rashes or lesions  Psychiatric   Orientation to person, place and time: Normal     Mood and affect: Abnormal   Mood and Affect: appropriate affect and depressed          Results/Data  Palliative Flow Sheet [id number] 10:19AM [place] [facility]     Test Name Result Flag Reference   Pain 7     Tiredness 5     Nausea 4     Depression 2     Anxious 2     Drowsy 2     Best Appetite 2     Best Feeling of Well Being 2     Shortness of Breath 10     Other Problem 5         Attending Note  Collaborating Physician Note: Collaborating Physician: I did not interview and examine the patient, I discussed the case with the Advanced Practitioner and reviewed the note and I agree with the Advanced Practitioner note  Future Appointments    Date/Time Provider Specialty Site   04/04/2017 05:20 PM Stephanie Taylor DO Pulmonary Medicine [facility]   04/07/2017 08:00 AM TOSHIA Rendon   Hematology Oncology [facility] MEDICAL ONCOLOGY   03/21/2017 02:30 PM Duyen Medrano DO Family Medicine [facility]   05/02/2017 08:40 AM Bertram Jang, [place] CARE     Signatures   Electronically signed by : Caty Mckeon, [address] [place] [address]; Mar 10 2017  7:56PM EST                       (Author)    Electronically signed by : TOSHIA Burt ; Mar 12 2017  1:10PM EST                       (Author)

## 2018-01-18 NOTE — RESULT NOTES
Verified Results  (1) COMPREHENSIVE METABOLIC PANEL [id number] 55:34XV Maryellen Guevara     Test Name Result Flag Reference   GLUCOSE,RANDM 79 mg/dL     If the patient is fasting, the ADA then defines impaired fasting glucose as > 100 mg/dL and diabetes as > or equal to 123 mg/dL  SODIUM 142 mmol/L  136-145   POTASSIUM 3 3 mmol/L L 3 5-5 3   CHLORIDE 104 mmol/L  100-108   CARBON DIOXIDE 25 mmol/L  21-32   ANION GAP (CALC) 13 mmol/L  4-13   BLOOD UREA NITROGEN 11 mg/dL  5-25   CREATININE 1 01 mg/dL  0 60-1 30   Standardized to IDMS reference method   CALCIUM 9 0 mg/dL  8 3-10 1   BILI, TOTAL 0 40 mg/dL  0 20-1 00   ALK PHOSPHATAS 68 U/L     ALT (SGPT) 29 U/L  12-78   AST(SGOT) 43 U/L  5-45   ALBUMIN 3 5 g/dL  3 5-5 0   TOTAL PROTEIN 7 1 g/dL  6 4-8 2   eGFR Non-African American      >60 0 ml/min/1 73sq [facility] Disease Education Program recommendations are as follows:  GFR calculation is accurate only with a steady state creatinine  Chronic Kidney disease less than 60 ml/min/1 73 sq  meters  Kidney failure less than 15 ml/min/1 73 sq  meters

## 2018-01-18 NOTE — MISCELLANEOUS
Message  Return to work or school:   Dominick Heller is under my professional care  He was seen in my office on 7/5/2016   He has Renal Cell Cancer with lung mets  He cannot walk more than 200 feet without extreme shortness of breath  He is legally blind and requires a cornea transplant  Please reconsider your decision to deny the patient access to the [facility] to [employer]               Signatures   Electronically signed by : Jessica Reeves, ; Aug 24 2016  2:40PM EST                       (Author)

## 2018-01-22 VITALS
TEMPERATURE: 99.6 F | SYSTOLIC BLOOD PRESSURE: 166 MMHG | RESPIRATION RATE: 16 BRPM | OXYGEN SATURATION: 94 % | HEIGHT: 69 IN | WEIGHT: 172 LBS | HEART RATE: 67 BPM | DIASTOLIC BLOOD PRESSURE: 106 MMHG | BODY MASS INDEX: 25.48 KG/M2

## 2018-01-22 VITALS
SYSTOLIC BLOOD PRESSURE: 130 MMHG | HEIGHT: 69 IN | BODY MASS INDEX: 25.23 KG/M2 | RESPIRATION RATE: 16 BRPM | WEIGHT: 170.38 LBS | TEMPERATURE: 97.7 F | HEART RATE: 76 BPM | OXYGEN SATURATION: 94 % | DIASTOLIC BLOOD PRESSURE: 90 MMHG

## 2018-01-22 VITALS
DIASTOLIC BLOOD PRESSURE: 88 MMHG | WEIGHT: 169.5 LBS | BODY MASS INDEX: 23.64 KG/M2 | HEART RATE: 64 BPM | RESPIRATION RATE: 16 BRPM | SYSTOLIC BLOOD PRESSURE: 128 MMHG

## 2018-03-07 NOTE — PROGRESS NOTES
History of Present Illness    Revaccination   Vaccine Information: Vaccine(s) Given (names): Lin Zavaleta [id number]  Spoke with patient regarding vaccine out of temperature range  Action(s): Pt will be revaccinated  Appointment scheduled: [id number]  Other Information: Revaccination  Revaccination Completed: [id number]  Active Problems    1  Abnormal ECG (794 31) (R94 31)   2  Allergic rhinitis due to pollen (477 0) (J30 1)   3  Anxiety disorder (300 00) (F41 9)   4  Arthralgia (719 40) (M25 50)   5  Asthma (493 90) (J45 909)   6  Atherosclerosis of coronary artery of native heart with unstable angina pectoris   (414 01,411 1) (I25 110)   7  Benign essential hypertension (401 1) (I10)   8  Blurring of vision (368 8) (H53 8)   9  BPH (benign prostatic hypertrophy) (600 00) (N40 0)   10  Cancer-related pain (338 3) (G89 3)   11  Cataract (366 9) (H26 9)   12  Chemotherapy-induced neuropathy (357 6,E933 1) (G62 0,T45  1X5A)   13  Dehydration (276 51) (E86 0)   14  Dyslipidemia (272 4) (E78 5)   15  EKG abnormalities (794 31) (R94 31)   16  Encounter for screening colonoscopy (V76 51) (Z12 11)   17  Enlarged prostate without lower urinary tract symptoms (luts) (600 00) (N40 0)   18  Fatigue (780 79) (R53 83)   19  Fuchs' corneal dystrophy (371 57) (H18 51)   20  GERD without esophagitis (530 81) (K21 9)   21  Hearing loss (389 9) (H91 90)   22  Hospital discharge follow-up (V67 59) (Z09)   23  Infectious colitis (009 0) (A09)   24  Insomnia (780 52) (G47 00)   25  Malignant neoplasm metastatic to lung (197 0) (C78 00)   26  Nausea (787 02) (R11 0)   27  Need for diphtheria-tetanus-pertussis (Tdap) vaccine (V06 1) (Z23)   28  Need for pneumococcal vaccination (V03 82) (Z23)   29  Need for prophylactic vaccination and inoculation against influenza (V04 81) (Z23)   30  Need for revaccination (V05 9) (Z23)   31  Other biomechanical lesion of abdomen or other region (738 8) (M91 67)   32   Pancreatic duct dilated (577  8) (K86 89)   33  Pleural effusion (511 9) (J90)   34  Preoperative cardiovascular examination (V72 81) (Z01 810)   35  Preoperative clearance (V72 84) (Z01 818)   36  Primary localized osteoarthrosis of left shoulder (715 11) (M19 012)   37  Prostate cancer screening (V76 44) (Z12 5)   38  Renal cell adenoma of left kidney (223 0) (D30 02)   39  Renal cell carcinoma, unspecified laterality (189 0) (C64 9)   40  Shoulder pain (719 41) (M25 519)   41  Sinus bradycardia (427 89) (R00 1)   42  Subdeltoid bursitis of right shoulder joint (726 19) (M75 51)   43  Under care of palliative care specialist (V66 7) (Z51 5)   44  Vitamin D deficiency (268 9) (E55 9)    Immunizations  Influenza --- Fabiola Vicente: 20-Sep-2013   PCV --- Fabiola Vicente: 10-Mar-2015   Tdap --- Series1: 22-Apr-2016     Current Meds   1  Advair Diskus 500-50 MCG/DOSE Inhalation Aerosol Powder Breath Activated; INHALE 1   PUFF TWICE DAILY   2  Cetirizine HCl - 10 MG Oral Tablet; TAKE 1 TABLET DAILY AS DIRECTED   3  Ciprofloxacin HCl - 500 MG Oral Tablet; take 1 tablet every twelve hours   4  Citalopram Hydrobromide 20 MG Oral Tablet; TAKE 1 TABLET DAILY   5  Finasteride 5 MG Oral Tablet; TAKE 1 TABLET DAILY   6  Fluticasone Propionate 50 MCG/ACT Nasal Suspension; instill 2 sprays into each nostril   once daily   7  LORazepam 1 MG Oral Tablet; take one and one half pills bid prn anxiety   8  Metoprolol Tartrate 25 MG Oral Tablet; take 1/2 tablet twice a day   9  MetroNIDAZOLE 500 MG Oral Tablet; TAKE 1 TABLET Every 8 hours   10  Omeprazole 20 MG Oral Capsule Delayed Release; take 1 capsule by mouth twice a day   11  Oxybutynin Chloride ER 10 MG Oral Tablet Extended Release 24 Hour; Take 1 tablet daily   12  OxyCODONE HCl - 5 MG Oral Tablet; Take 1-2 tablets every 4 hrs as needed for pain   13  Potassium Chloride Ximena ER 20 MEQ Oral Tablet Extended Release; take 1 tablet by    mouth once daily   14  Pravastatin Sodium 10 MG Oral Tablet;  Take 1 tablet by mouth at bedtime   15  ProAir  (90 Base) MCG/ACT Inhalation Aerosol Solution; INHALE 1-2 PUFFS    EVERY 4-6 HOURS AS NEEDED AND AS DIRECTED   16  Tamsulosin HCl - 0 4 MG Oral Capsule; TAKE 1 CAPSULE Bedtime   17  TraZODone HCl - 50 MG Oral Tablet; TAKE 1 AND 1/2 TABLETS AT BEDTIME   18  Valsartan-Hydrochlorothiazide 320-25 MG Oral Tablet; Take 1 tablet daily   19  Votrient 200 MG Oral Tablet; 4 tabs po daily    Allergies    1  Cymbalta CPEP   2  Effexor   3  Morphine Derivatives    4  No Known Environmental Allergies   5  No Known Food Allergies    Plan    1  RVAC-Adacel 5-2-15 5 LF-MCG/0 5 Intramuscular Suspension    Education  Education Items with no Session   RVAC-Adacel 5-2-15 5 LF-MCG/0 5 Intramuscular Suspension;  Provided: [id number]  11:37AM; Counselor: Sana Fuller; Future Appointments    Date/Time Provider Specialty Site   01/06/2017 08:45 AM TOSHIA Moore  [facility]ology [facility]   01/13/2017 03:00 PM TOSHIA Rao   Cardiology [facility] [place]   02/06/2017 09:00 AM Danilo Pacheco, [address] [facility]     Signatures   Electronically signed by : Venkata Patrick DO; Jan 5 2017  9:39AM EST                       (Author)

## 2021-07-08 ENCOUNTER — TRANSCRIBE ORDERS (OUTPATIENT)
Dept: ADMINISTRATIVE | Facility: HOSPITAL | Age: 66
End: 2021-07-08

## 2021-07-08 DIAGNOSIS — C76.0 CANCER OF NECK (HCC): ICD-10-CM

## 2021-07-08 DIAGNOSIS — J38.00 VOCAL CORD PARALYSIS: Primary | ICD-10-CM

## 2021-07-13 ENCOUNTER — TRANSCRIBE ORDERS (OUTPATIENT)
Dept: ADMINISTRATIVE | Facility: HOSPITAL | Age: 66
End: 2021-07-13

## 2021-07-13 DIAGNOSIS — C77.0 SECONDARY AND UNSPECIFIED MALIGNANT NEOPLASM OF LYMPH NODES OF HEAD, FACE AND NECK (HCC): Primary | ICD-10-CM

## 2021-07-14 ENCOUNTER — APPOINTMENT (OUTPATIENT)
Dept: PET IMAGING | Facility: HOSPITAL | Age: 66
End: 2021-07-14

## 2021-07-20 ENCOUNTER — APPOINTMENT (OUTPATIENT)
Dept: OTHER | Facility: HOSPITAL | Age: 66
End: 2021-07-20

## 2021-07-20 ENCOUNTER — HOSPITAL ENCOUNTER (OUTPATIENT)
Dept: PET IMAGING | Facility: HOSPITAL | Age: 66
Discharge: HOME | End: 2021-07-20

## 2021-07-20 DIAGNOSIS — C77.0 SECONDARY AND UNSPECIFIED MALIGNANT NEOPLASM OF LYMPH NODES OF HEAD, FACE AND NECK (HCC): ICD-10-CM

## 2021-07-20 DIAGNOSIS — Z09 FOLLOW UP: ICD-10-CM

## 2021-07-20 LAB — GLUCOSE BLDC GLUCOMTR-MCNC: 105 MG/DL (ref 70–130)

## 2021-07-20 PROCEDURE — 78815 PET IMAGE W/CT SKULL-THIGH: CPT

## 2021-07-20 PROCEDURE — 0 FLUDEOXYGLUCOSE F18 SOLUTION: Performed by: OTOLARYNGOLOGY

## 2021-07-20 PROCEDURE — A9552 F18 FDG: HCPCS | Performed by: OTOLARYNGOLOGY

## 2021-07-20 PROCEDURE — 82962 GLUCOSE BLOOD TEST: CPT

## 2021-07-20 RX ADMIN — FLUDEOXYGLUCOSE F18 1 DOSE: 300 INJECTION INTRAVENOUS at 08:15

## 2021-09-24 ENCOUNTER — APPOINTMENT (OUTPATIENT)
Dept: CT IMAGING | Facility: HOSPITAL | Age: 66
End: 2021-09-24

## 2021-09-24 ENCOUNTER — HOSPITAL ENCOUNTER (EMERGENCY)
Facility: HOSPITAL | Age: 66
Discharge: ANOTHER HOSPITAL | End: 2021-09-24
Attending: EMERGENCY MEDICINE | Admitting: EMERGENCY MEDICINE

## 2021-09-24 VITALS
DIASTOLIC BLOOD PRESSURE: 67 MMHG | TEMPERATURE: 97.7 F | HEART RATE: 91 BPM | RESPIRATION RATE: 18 BRPM | WEIGHT: 136.6 LBS | SYSTOLIC BLOOD PRESSURE: 114 MMHG | OXYGEN SATURATION: 96 %

## 2021-09-24 DIAGNOSIS — I70.209 ARTERIAL OCCLUSION, LOWER EXTREMITY (HCC): Primary | ICD-10-CM

## 2021-09-24 LAB
ALBUMIN SERPL-MCNC: 3.2 G/DL (ref 3.5–5.2)
ALBUMIN/GLOB SERPL: 1 G/DL
ALP SERPL-CCNC: 98 U/L (ref 39–117)
ALT SERPL W P-5'-P-CCNC: 14 U/L (ref 1–41)
ANION GAP SERPL CALCULATED.3IONS-SCNC: 10.5 MMOL/L (ref 5–15)
APTT PPP: 34.9 SECONDS (ref 24.3–38.1)
AST SERPL-CCNC: 15 U/L (ref 1–40)
BASOPHILS # BLD AUTO: 0.02 10*3/MM3 (ref 0–0.2)
BASOPHILS NFR BLD AUTO: 0.1 % (ref 0–1.5)
BILIRUB SERPL-MCNC: 0.3 MG/DL (ref 0–1.2)
BUN SERPL-MCNC: 23 MG/DL (ref 8–23)
BUN/CREAT SERPL: 27.1 (ref 7–25)
CALCIUM SPEC-SCNC: 8.6 MG/DL (ref 8.6–10.5)
CHLORIDE SERPL-SCNC: 96 MMOL/L (ref 98–107)
CO2 SERPL-SCNC: 27.5 MMOL/L (ref 22–29)
CREAT SERPL-MCNC: 0.85 MG/DL (ref 0.76–1.27)
DEPRECATED RDW RBC AUTO: 47.2 FL (ref 37–54)
EOSINOPHIL # BLD AUTO: 0.01 10*3/MM3 (ref 0–0.4)
EOSINOPHIL NFR BLD AUTO: 0.1 % (ref 0.3–6.2)
ERYTHROCYTE [DISTWIDTH] IN BLOOD BY AUTOMATED COUNT: 14.8 % (ref 12.3–15.4)
GFR SERPL CREATININE-BSD FRML MDRD: 90 ML/MIN/1.73
GLOBULIN UR ELPH-MCNC: 3.3 GM/DL
GLUCOSE SERPL-MCNC: 102 MG/DL (ref 65–99)
HCT VFR BLD AUTO: 30.4 % (ref 37.5–51)
HGB BLD-MCNC: 9.8 G/DL (ref 13–17.7)
HOLD SPECIMEN: NORMAL
HOLD SPECIMEN: NORMAL
IMM GRANULOCYTES # BLD AUTO: 0.12 10*3/MM3 (ref 0–0.05)
IMM GRANULOCYTES NFR BLD AUTO: 0.8 % (ref 0–0.5)
INR PPP: 1.07 (ref 0.9–1.1)
LYMPHOCYTES # BLD AUTO: 1.23 10*3/MM3 (ref 0.7–3.1)
LYMPHOCYTES NFR BLD AUTO: 8.7 % (ref 19.6–45.3)
MCH RBC QN AUTO: 29.8 PG (ref 26.6–33)
MCHC RBC AUTO-ENTMCNC: 32.2 G/DL (ref 31.5–35.7)
MCV RBC AUTO: 92.4 FL (ref 79–97)
MONOCYTES # BLD AUTO: 0.54 10*3/MM3 (ref 0.1–0.9)
MONOCYTES NFR BLD AUTO: 3.8 % (ref 5–12)
NEUTROPHILS NFR BLD AUTO: 12.2 10*3/MM3 (ref 1.7–7)
NEUTROPHILS NFR BLD AUTO: 86.5 % (ref 42.7–76)
NRBC BLD AUTO-RTO: 0 /100 WBC (ref 0–0.2)
PLATELET # BLD AUTO: 384 10*3/MM3 (ref 140–450)
PMV BLD AUTO: 9.2 FL (ref 6–12)
POTASSIUM SERPL-SCNC: 4.1 MMOL/L (ref 3.5–5.2)
PROT SERPL-MCNC: 6.5 G/DL (ref 6–8.5)
PROTHROMBIN TIME: 13.9 SECONDS (ref 12.1–15)
QT INTERVAL: 420 MS
RBC # BLD AUTO: 3.29 10*6/MM3 (ref 4.14–5.8)
SODIUM SERPL-SCNC: 134 MMOL/L (ref 136–145)
WBC # BLD AUTO: 14.12 10*3/MM3 (ref 3.4–10.8)
WHOLE BLOOD HOLD SPECIMEN: NORMAL
WHOLE BLOOD HOLD SPECIMEN: NORMAL

## 2021-09-24 PROCEDURE — 96365 THER/PROPH/DIAG IV INF INIT: CPT

## 2021-09-24 PROCEDURE — 99283 EMERGENCY DEPT VISIT LOW MDM: CPT

## 2021-09-24 PROCEDURE — 85610 PROTHROMBIN TIME: CPT | Performed by: EMERGENCY MEDICINE

## 2021-09-24 PROCEDURE — 80053 COMPREHEN METABOLIC PANEL: CPT | Performed by: EMERGENCY MEDICINE

## 2021-09-24 PROCEDURE — 0 IOPAMIDOL PER 1 ML: Performed by: EMERGENCY MEDICINE

## 2021-09-24 PROCEDURE — 99285 EMERGENCY DEPT VISIT HI MDM: CPT | Performed by: EMERGENCY MEDICINE

## 2021-09-24 PROCEDURE — 75635 CT ANGIO ABDOMINAL ARTERIES: CPT

## 2021-09-24 PROCEDURE — 93005 ELECTROCARDIOGRAM TRACING: CPT | Performed by: EMERGENCY MEDICINE

## 2021-09-24 PROCEDURE — 85025 COMPLETE CBC W/AUTO DIFF WBC: CPT | Performed by: EMERGENCY MEDICINE

## 2021-09-24 PROCEDURE — 85730 THROMBOPLASTIN TIME PARTIAL: CPT | Performed by: EMERGENCY MEDICINE

## 2021-09-24 PROCEDURE — 93010 ELECTROCARDIOGRAM REPORT: CPT | Performed by: INTERNAL MEDICINE

## 2021-09-24 PROCEDURE — 25010000002 HEPARIN (PORCINE) PER 1000 UNITS: Performed by: EMERGENCY MEDICINE

## 2021-09-24 RX ORDER — SODIUM CHLORIDE 0.9 % (FLUSH) 0.9 %
10 SYRINGE (ML) INJECTION AS NEEDED
Status: DISCONTINUED | OUTPATIENT
Start: 2021-09-24 | End: 2021-09-24 | Stop reason: HOSPADM

## 2021-09-24 RX ORDER — HEPARIN SODIUM 10000 [USP'U]/100ML
12 INJECTION, SOLUTION INTRAVENOUS
Status: DISCONTINUED | OUTPATIENT
Start: 2021-09-24 | End: 2021-09-24 | Stop reason: HOSPADM

## 2021-09-24 RX ADMIN — IOPAMIDOL 100 ML: 755 INJECTION, SOLUTION INTRAVENOUS at 09:53

## 2021-09-24 RX ADMIN — HEPARIN SODIUM 12 UNITS/KG/HR: 10000 INJECTION, SOLUTION INTRAVENOUS at 08:19
